# Patient Record
Sex: MALE | Race: WHITE | NOT HISPANIC OR LATINO | Employment: STUDENT | URBAN - METROPOLITAN AREA
[De-identification: names, ages, dates, MRNs, and addresses within clinical notes are randomized per-mention and may not be internally consistent; named-entity substitution may affect disease eponyms.]

---

## 2019-04-08 ENCOUNTER — OFFICE VISIT (OUTPATIENT)
Dept: URGENT CARE | Facility: CLINIC | Age: 19
End: 2019-04-08
Payer: COMMERCIAL

## 2019-04-08 VITALS
DIASTOLIC BLOOD PRESSURE: 83 MMHG | RESPIRATION RATE: 16 BRPM | OXYGEN SATURATION: 99 % | HEIGHT: 72 IN | WEIGHT: 180 LBS | TEMPERATURE: 98.2 F | SYSTOLIC BLOOD PRESSURE: 149 MMHG | BODY MASS INDEX: 24.38 KG/M2 | HEART RATE: 76 BPM

## 2019-04-08 DIAGNOSIS — R73.9 HYPERGLYCEMIA: Primary | ICD-10-CM

## 2019-04-08 PROCEDURE — 99203 OFFICE O/P NEW LOW 30 MIN: CPT | Performed by: EMERGENCY MEDICINE

## 2022-04-06 ENCOUNTER — OFFICE VISIT (OUTPATIENT)
Dept: URGENT CARE | Facility: CLINIC | Age: 22
End: 2022-04-06
Payer: COMMERCIAL

## 2022-04-06 VITALS
BODY MASS INDEX: 29.8 KG/M2 | SYSTOLIC BLOOD PRESSURE: 161 MMHG | DIASTOLIC BLOOD PRESSURE: 101 MMHG | HEART RATE: 96 BPM | RESPIRATION RATE: 18 BRPM | WEIGHT: 220 LBS | HEIGHT: 72 IN | OXYGEN SATURATION: 97 %

## 2022-04-06 DIAGNOSIS — H60.392 OTHER INFECTIVE ACUTE OTITIS EXTERNA OF LEFT EAR: Primary | ICD-10-CM

## 2022-04-06 PROCEDURE — 99213 OFFICE O/P EST LOW 20 MIN: CPT

## 2022-04-06 RX ORDER — INSULIN ASPART 100 [IU]/ML
INJECTION, SOLUTION INTRAVENOUS; SUBCUTANEOUS
COMMUNITY
Start: 2022-03-11

## 2022-04-06 RX ORDER — OFLOXACIN 3 MG/ML
10 SOLUTION AURICULAR (OTIC) DAILY
Qty: 5 ML | Refills: 0 | Status: SHIPPED | OUTPATIENT
Start: 2022-04-06 | End: 2022-04-13

## 2022-04-06 RX ORDER — INSULIN GLARGINE 100 [IU]/ML
INJECTION, SOLUTION SUBCUTANEOUS
COMMUNITY
Start: 2022-03-14

## 2022-04-06 NOTE — PATIENT INSTRUCTIONS
Administer 10 drops of ofloxacin otic solution to the left ear 1 time a day for the next 7 days  Avoid leaving water in your ear, make sure the clean ears after shower or when you submerge your head after showering or swimming

## 2022-04-06 NOTE — PROGRESS NOTES
[facility]        NAME: Laurie Kraus is a 20 y o  male  : 2000    MRN: [id number]  DATE: 2022  TIME: 6:18 PM    Assessment and Plan   Other infective acute otitis externa of left ear [H60 392]  1  Other infective acute otitis externa of left ear  ofloxacin (FLOXIN) 0 3 % otic solution         Patient Instructions     Administer 10 drops of ofloxacin otic solution to the left ear 1 time a day for the next 7 days  Avoid leaving water in your ear, make sure the clean ears after shower or when you submerge your head after showering or swimming  Follow up with PCP in 3-5 days  Proceed to  ER if symptoms worsen  Chief Complaint     Chief Complaint   Patient presents with   Ruthann Canter     left ear pain with drainage; started yesterday         History of Present Illness     Laurie Kraus is a 20 y o  male who presents with complaint of left ear drainage for the past day  Patient stated he woke up yesterday with left ear drainage, and has been draining since then  He presents today with continued left ear drainage, but denies pain  He states that he does have some mild tinnitus and hearing loss in the left ear  He denies fevers, chills, night sweats  Review of Systems   Review of Systems   Constitutional: Negative for chills, fatigue and fever  HENT: Positive for ear discharge and tinnitus (left)  Negative for ear pain  Respiratory: Negative for cough and shortness of breath  Cardiovascular: Negative for chest pain and palpitations  Gastrointestinal: Negative for abdominal pain, constipation, diarrhea, nausea and vomiting  Neurological: Negative for headaches  All other systems reviewed and are negative          Current Medications       Current Outpatient Medications:     insulin aspart (NovoLOG FlexPen) 100 UNIT/ML injection pen, , Disp: , Rfl:     Lantus SoloStar 100 units/mL injection pen, , Disp: , Rfl:     ofloxacin (FLOXIN) 0 3 % otic solution, Administer 10 drops into the left ear daily for 7 days, Disp: 5 mL, Rfl: 0    Current Allergies     Allergies as of 04/06/2022    (No Known Allergies)            The following portions of the patient's history were reviewed and updated as appropriate: allergies, current medications, past family history, past medical history, past social history, past surgical history and problem list      Past Medical History:   Diagnosis Date    Diabetes mellitus ([facility] [address] )        History reviewed  No pertinent surgical history  Family History   Problem Relation Age of Onset    Cancer Father          Medications have been verified  Objective   BP (!) 161/101   Pulse 96   Resp 18   Ht 6' (1 829 m)   Wt 99 8 kg (220 lb)   SpO2 97%   BMI 29 84 kg/m²   No LMP for male patient  Physical Exam     Physical Exam  Vitals and nursing note reviewed  Constitutional:       General: He is awake  He is not in acute distress  Appearance: Normal appearance  He is well-developed and well-groomed  He is obese  He is not ill-appearing  HENT:      Head: Normocephalic and atraumatic  Right Ear: Hearing, ear canal and external ear normal  No decreased hearing noted  No tenderness  Tympanic membrane is injected  Tympanic membrane is not perforated or erythematous  Left Ear: Decreased hearing noted  No tenderness  Tympanic membrane is not injected, perforated or erythematous  Cardiovascular:      Rate and Rhythm: Normal rate and regular rhythm  Heart sounds: Normal heart sounds  Pulmonary:      Effort: Pulmonary effort is normal       Breath sounds: Normal breath sounds  No wheezing, rhonchi or rales  Neurological:      Mental Status: He is alert  Psychiatric:         Behavior: Behavior is cooperative

## 2024-06-07 ENCOUNTER — OFFICE VISIT (OUTPATIENT)
Dept: URGENT CARE | Facility: CLINIC | Age: 24
End: 2024-06-07
Payer: COMMERCIAL

## 2024-06-07 VITALS
HEART RATE: 73 BPM | WEIGHT: 248 LBS | TEMPERATURE: 98 F | SYSTOLIC BLOOD PRESSURE: 146 MMHG | DIASTOLIC BLOOD PRESSURE: 96 MMHG | HEIGHT: 72 IN | BODY MASS INDEX: 33.59 KG/M2 | RESPIRATION RATE: 18 BRPM | TEMPERATURE: 98 F | OXYGEN SATURATION: 98 % | HEIGHT: 72 IN | BODY MASS INDEX: 33.59 KG/M2 | DIASTOLIC BLOOD PRESSURE: 96 MMHG | WEIGHT: 248 LBS | RESPIRATION RATE: 18 BRPM | HEART RATE: 73 BPM | SYSTOLIC BLOOD PRESSURE: 146 MMHG | OXYGEN SATURATION: 98 %

## 2024-06-07 DIAGNOSIS — G43.909 MIGRAINE WITHOUT STATUS MIGRAINOSUS, NOT INTRACTABLE, UNSPECIFIED MIGRAINE TYPE: Primary | ICD-10-CM

## 2024-06-07 PROCEDURE — G0382 LEV 3 HOSP TYPE B ED VISIT: HCPCS

## 2024-06-07 PROCEDURE — S9083 URGENT CARE CENTER GLOBAL: HCPCS

## 2024-06-07 RX ORDER — INSULIN GLARGINE 100 [IU]/ML
INJECTION, SOLUTION SUBCUTANEOUS
COMMUNITY

## 2024-06-07 RX ORDER — INSULIN ASPART 100 [IU]/ML
INJECTION, SUSPENSION SUBCUTANEOUS
COMMUNITY

## 2024-06-07 RX ORDER — RIZATRIPTAN BENZOATE 10 MG/1
10 TABLET ORAL AS NEEDED
Qty: 18 TABLET | Refills: 0 | Status: SHIPPED | OUTPATIENT
Start: 2024-06-07

## 2024-06-07 RX ORDER — ONDANSETRON 4 MG/1
4 TABLET, FILM COATED ORAL EVERY 8 HOURS PRN
Qty: 20 TABLET | Refills: 0 | Status: SHIPPED | OUTPATIENT
Start: 2024-06-07

## 2024-06-07 NOTE — PROGRESS NOTES
[facility]        NAME: Willam Mena is a 24 y.o. male  : 2000    MRN: [id number]  DATE: 2024  TIME: 2:04 PM    Assessment and Plan   Migraine without status migrainosus, not intractable, unspecified migraine type [G43.909]  1. Migraine without status migrainosus, not intractable, unspecified migraine type  rizatriptan (Maxalt) 10 mg tablet    ondansetron (ZOFRAN) 4 mg tablet        Will treat with maxalt and zofran  Encouraged supportive care  Follow up with PCP    Patient Instructions     Take Maxalt as prescribed  Zofran as prescribed    Advil 600mg + Tylenol 500mg every 6 hours  Over the counter benadryl  Drink plenty of water and avoid dehydrating fluids  Make sure to eat regularly     Follow up with PCP in 3-5 days.  Proceed to  ER if symptoms worsen.    If tests are performed, our office will contact you with results only if changes need to made to the care plan discussed with you at the visit. You can review your full results on [facility].    Chief Complaint     Chief Complaint   Patient presents with    Headache     Has been having headaches over the past week; today was the worse- has nausea and vomited 1x          History of Present Illness       24-year-old male with significant past medical history of diabetes that is on insulin arrives reporting headaches for the past week.  Patient reports the headaches wax and wanes, but never really goes away.  Patient reports pain is left-sided and worse around the left temple area.  Patient reports he has been taking Tylenol with mild relief of pain.  Patient reports that today the headache was worse, and when he went to try to eat breakfast he felt nauseated and threw it back up.  Patient denies abdominal pain, chest pain, or shortness of breath.  Patient denies blurry vision or double vision.  Patient denies any weakness in any extremities.  Patient denies dizziness, or lightheadedness, or any syncopal event.  Patient  reports he checked his blood sugar yesterday and it was 120, and that is his typical baseline.  Patient denies any fluctuations in his blood sugar readings.    Headache      Review of Systems   Review of Systems   Constitutional:  Negative for chills, fatigue and fever.   HENT:  Negative for congestion, postnasal drip, sinus pressure, sinus pain and sore throat.    Eyes:  Negative for pain.   Respiratory:  Negative for cough and shortness of breath.    Cardiovascular:  Negative for chest pain.   Gastrointestinal:  Positive for nausea and vomiting. Negative for abdominal pain and diarrhea.   Genitourinary:  Negative for difficulty urinating.   Musculoskeletal:  Negative for back pain.   Skin:  Negative for color change.   Neurological:  Positive for headaches. Negative for dizziness.         Current Medications       Current Outpatient Medications:     insulin aspart protamine-insulin aspart (NovoLOG 70/30) 100 units/mL injection, Inject under the skin 2 (two) times a day before meals, Disp: , Rfl:     insulin glargine (LANTUS) 100 units/mL subcutaneous injection, Inject under the skin, Disp: , Rfl:     ondansetron (ZOFRAN) 4 mg tablet, Take 1 tablet (4 mg total) by mouth every 8 (eight) hours as needed for nausea or vomiting, Disp: 20 tablet, Rfl: 0    rizatriptan (Maxalt) 10 mg tablet, Take 1 tablet (10 mg total) by mouth as needed for migraine (do not exceed 30mg in 24 hours) Take at the onset of migraine; if symptoms continue or return, may take another dose at least 2 hours after first dose. Take no more than 2 doses in a day., Disp: 18 tablet, Rfl: 0    Current Allergies     Allergies as of 06/07/2024    (No Known Allergies)            The following portions of the patient's history were reviewed and updated as appropriate: allergies, current medications, past family history, past medical history, past social history, past surgical history and problem list.     Past Medical History:   Diagnosis Date    Diabetes  ([facility])        History reviewed. No pertinent surgical history.    History reviewed. No pertinent family history.      Medications have been verified.        Objective   /96   Pulse 73   Temp 98 °F (36.7 °C)   Resp 18   Ht 6' (1.829 m)   Wt 112 kg (248 lb)   SpO2 98%   BMI 33.63 kg/m²        Physical Exam     Physical Exam  Vitals and nursing note reviewed.   Constitutional:       Appearance: Normal appearance.   HENT:      Head: Normocephalic.      Right Ear: Tympanic membrane, ear canal and external ear normal.      Left Ear: Tympanic membrane, ear canal and external ear normal.      Nose: Nose normal. No congestion or rhinorrhea.      Mouth/Throat:      Mouth: Mucous membranes are moist.      Pharynx: No oropharyngeal exudate or posterior oropharyngeal erythema.   Eyes:      General: Lids are normal. Vision grossly intact.      Extraocular Movements: Extraocular movements intact.      Right eye: No nystagmus.      Left eye: No nystagmus.      Conjunctiva/sclera: Conjunctivae normal.      Pupils: Pupils are equal, round, and reactive to light.   Cardiovascular:      Rate and Rhythm: Normal rate and regular rhythm.      Pulses: Normal pulses.      Heart sounds: Normal heart sounds.   Pulmonary:      Effort: Pulmonary effort is normal.      Breath sounds: Normal breath sounds. No wheezing.   Abdominal:      Palpations: Abdomen is soft.      Tenderness: There is no abdominal tenderness.   Musculoskeletal:         General: Normal range of motion.      Cervical back: Normal range of motion and neck supple.   Lymphadenopathy:      Cervical: No cervical adenopathy.   Skin:     General: Skin is warm and dry.   Neurological:      General: No focal deficit present.      Mental Status: He is alert and oriented to person, place, and time.      GCS: GCS eye subscore is 4. GCS verbal subscore is 5. GCS motor subscore is 6.      Cranial Nerves: No cranial nerve deficit.      Sensory: No sensory deficit.      Motor: No  weakness.      Coordination: Romberg sign negative.      Gait: Gait normal.   Psychiatric:         Mood and Affect: Mood normal.         Behavior: Behavior normal.

## 2024-06-07 NOTE — PATIENT INSTRUCTIONS
Take Maxalt as prescribed  Zofran as prescribed    Advil 600mg + Tylenol 500mg every 6 hours  Over the counter benadryl  Drink plenty of water and avoid dehydrating fluids  Make sure to eat regularly     Follow up with PCP in 3-5 days.  Proceed to  ER if symptoms worsen.    If tests are performed, our office will contact you with results only if changes need to made to the care plan discussed with you at the visit. You can review your full results on [facility].  Migraine Headache   WHAT YOU NEED TO KNOW:   A migraine is a severe headache. The pain can be so severe that it interferes with your daily activities. A migraine can last a few hours up to several days. The exact cause of migraines is not known.  DISCHARGE INSTRUCTIONS:   Call your local emergency number (911 in the ) or have someone call if:   You feel like you are going to faint, you become confused, or you have a seizure.      Return to the emergency department if:   You have a headache that seems different or much worse than your usual migraine headache.    You have a severe headache with a fever or a stiff neck.    You have new problems with speech, vision, balance, or movement.    Call your doctor or neurologist if:   Your migraines interfere with your daily activities.    Your medicines or treatments stop working.    You have questions or concerns about your condition or care.    Medicines:  Some medicines may only be given while you are in the emergency department. You may also need medicines later to manage migraines or other health problems they can cause. Take medicine as soon as you feel a migraine begin, or as directed.  Migraine medicines  are used to help prevent or stop a migraine.    NSAIDs  help decrease swelling and pain or fever. This medicine is available with or without a doctor's order. NSAIDs can cause stomach bleeding or kidney problems in certain people. If you take blood thinner medicine, always ask your healthcare  provider if NSAIDs are safe for you. Always read the medicine label and follow directions.    Acetaminophen  decreases pain and fever. It is available without a doctor's order. Ask how much to take and how often to take it. Follow directions. Read the labels of all other medicines you are using to see if they also contain acetaminophen, or ask your doctor or pharmacist. Acetaminophen can cause liver damage if not taken correctly.    Prescription pain medicine  may be given. Ask your healthcare provider how to take this medicine safely. Some prescription pain medicines contain acetaminophen. Do not take other medicines that contain acetaminophen without talking to your healthcare provider. Too much acetaminophen may cause liver damage. Prescription pain medicine may cause constipation. Ask your healthcare provider how to prevent or treat constipation.     Antinausea medicine  may be given to calm your stomach and to help prevent vomiting. This medicine can also help relieve pain.    Steroids  may be given to prevent a migraine from coming back right away.    Take your medicine as directed.  Contact your healthcare provider if you think your medicine is not helping or if you have side effects. Tell your provider if you are allergic to any medicine. Keep a list of the medicines, vitamins, and herbs you take. Include the amounts, and when and why you take them. Bring the list or the pill bottles to follow-up visits. Carry your medicine list with you in case of an emergency.    Manage your symptoms:   Rest in a dark, quiet room.  This will help decrease your pain. Sleep may also help relieve the pain.    Apply ice to decrease pain.  Use an ice pack, or put crushed ice in a plastic bag. Cover the ice pack with a towel and place it on your head. Apply ice for 15 to 20 minutes every hour.    Apply heat to decrease pain and muscle spasms.  Use a small towel dampened with warm water or a heating pad, or sit in a warm bath.  Apply heat on the area for 20 to 30 minutes every 2 hours. You may alternate heat and ice.    Keep a migraine record.  Write down when your migraines start and stop. Include your symptoms and what you were doing when a migraine began. Record what you ate or drank for 24 hours before the migraine started. Keep track of what you did to treat your migraine and if it worked. Bring the migraine record with you to visits with your healthcare provider.    Common triggers for a migraine include the following:   Stress, eye strain, oversleeping, or not getting enough sleep    Hormone changes in women from birth control pills, pregnancy, menopause, or during a monthly period    Skipping meals, going too long without eating, or not drinking enough liquids    Certain foods or drinks such as chocolate, hard cheese, alcohol, or drinks that contain caffeine    Foods that contain gluten, nitrates, MSG, or artificial sweeteners    Sunlight, bright or flashing lights, loud noises, smoke, or strong smells    Heat, humidity, or changes in the weather    Prevent another migraine:   Prevent a medicine overuse headache.  Take pain medicines only as long as directed. A medicine may be limited to a certain amount each month. Your healthcare provider can help you create a plan so you get a safe amount each month.    Do not smoke.  Nicotine and other chemicals in cigarettes and cigars can trigger a migraine or make it worse. Ask your healthcare provider for information if you currently smoke and need help to quit. E-cigarettes or smokeless tobacco still contain nicotine. Talk to your healthcare provider before you use these products.    Do not drink alcohol.  Alcohol can trigger a migraine. It can also keep medicines used to treat your migraines from working.    Be physically active.  Physical activity, such as exercise, may help prevent migraines. Talk to your healthcare provider about the best activity plan for you. Try to get at least 30  minutes of physical activity on most days.         Manage stress.  Stress may trigger a migraine. Learn new ways to relax, such as deep breathing.    Create a sleep schedule.  Go to bed and get up at the same times each day. Do not watch television before bed.    Eat a variety of healthy foods.  Include healthy foods such as fruit, vegetables, whole-grain breads, low-fat dairy products, beans, lean meat, and fish. Do not have food or drinks that trigger your migraines.         Drink more liquids to prevent dehydration.  Your healthcare provider can tell you how much liquid to drink each day and which liquids are best for you.    Follow up with your doctor or neurologist as directed:  Bring your migraine record with you. Write down your questions so you remember to ask them during your visits.  © Copyright [employer] 2023 Information is for End User's use only and may not be sold, redistributed or otherwise used for commercial purposes.  The above information is an  only. It is not intended as medical advice for individual conditions or treatments. Talk to your doctor, nurse or pharmacist before following any medical regimen to see if it is safe and effective for you.

## 2024-06-07 NOTE — LETTER
June 7, 2024     Patient: Willam Mena   YOB: 2000   Date of Visit: 6/7/2024       To Whom it May Concern:    Willam Mena was seen in my clinic on 6/7/2024. He may return to work on 06/08/2024 .    If you have any questions or concerns, please don't hesitate to call.         Sincerely,          ELLYN Zimmerman        CC: No Recipients

## 2024-06-09 ENCOUNTER — HOSPITAL ENCOUNTER (EMERGENCY)
Facility: HOSPITAL | Age: 24
Discharge: HOME | End: 2024-06-09
Attending: EMERGENCY MEDICINE
Payer: COMMERCIAL

## 2024-06-09 ENCOUNTER — APPOINTMENT (EMERGENCY)
Dept: CT IMAGING | Facility: HOSPITAL | Age: 24
End: 2024-06-09
Payer: COMMERCIAL

## 2024-06-09 VITALS
RESPIRATION RATE: 18 BRPM | DIASTOLIC BLOOD PRESSURE: 95 MMHG | OXYGEN SATURATION: 100 % | HEIGHT: 72 IN | SYSTOLIC BLOOD PRESSURE: 159 MMHG | BODY MASS INDEX: 33.35 KG/M2 | WEIGHT: 246.25 LBS | HEART RATE: 75 BPM | TEMPERATURE: 97.9 F

## 2024-06-09 DIAGNOSIS — N17.9 PRERENAL ACUTE RENAL FAILURE (HCC): ICD-10-CM

## 2024-06-09 DIAGNOSIS — E86.0 DEHYDRATION: ICD-10-CM

## 2024-06-09 DIAGNOSIS — R51.9 HEADACHE: Primary | ICD-10-CM

## 2024-06-09 LAB
ALBUMIN SERPL BCP-MCNC: 4.2 G/DL (ref 3.5–5)
ALP SERPL-CCNC: 63 U/L (ref 34–104)
ALT SERPL W P-5'-P-CCNC: 18 U/L (ref 7–52)
ANION GAP SERPL CALCULATED.3IONS-SCNC: 7 MMOL/L (ref 4–13)
ANION GAP SERPL CALCULATED.3IONS-SCNC: 7 MMOL/L (ref 4–13)
AST SERPL W P-5'-P-CCNC: 16 U/L (ref 13–39)
BASOPHILS # BLD AUTO: 0.04 THOUSANDS/ÂΜL (ref 0–0.1)
BASOPHILS NFR BLD AUTO: 1 % (ref 0–1)
BILIRUB SERPL-MCNC: 0.87 MG/DL (ref 0.2–1)
BUN SERPL-MCNC: 20 MG/DL (ref 5–25)
BUN SERPL-MCNC: 21 MG/DL (ref 5–25)
CALCIUM SERPL-MCNC: 9.2 MG/DL (ref 8.4–10.2)
CALCIUM SERPL-MCNC: 9.4 MG/DL (ref 8.4–10.2)
CHLORIDE SERPL-SCNC: 105 MMOL/L (ref 96–108)
CHLORIDE SERPL-SCNC: 106 MMOL/L (ref 96–108)
CK SERPL-CCNC: 102 U/L (ref 39–308)
CO2 SERPL-SCNC: 26 MMOL/L (ref 21–32)
CO2 SERPL-SCNC: 27 MMOL/L (ref 21–32)
CREAT SERPL-MCNC: 1.79 MG/DL (ref 0.6–1.3)
CREAT SERPL-MCNC: 1.87 MG/DL (ref 0.6–1.3)
EOSINOPHIL # BLD AUTO: 0.04 THOUSAND/ÂΜL (ref 0–0.61)
EOSINOPHIL NFR BLD AUTO: 1 % (ref 0–6)
ERYTHROCYTE [DISTWIDTH] IN BLOOD BY AUTOMATED COUNT: 12.7 % (ref 11.6–15.1)
GFR SERPL CREATININE-BSD FRML MDRD: 48 ML/MIN/1.73SQ M
GFR SERPL CREATININE-BSD FRML MDRD: 51 ML/MIN/1.73SQ M
GLUCOSE SERPL-MCNC: 85 MG/DL (ref 65–140)
GLUCOSE SERPL-MCNC: 91 MG/DL (ref 65–140)
HCT VFR BLD AUTO: 44.6 % (ref 36.5–49.3)
HGB BLD-MCNC: 14.7 G/DL (ref 12–17)
IMM GRANULOCYTES # BLD AUTO: 0.01 THOUSAND/UL (ref 0–0.2)
IMM GRANULOCYTES NFR BLD AUTO: 0 % (ref 0–2)
LYMPHOCYTES # BLD AUTO: 1.67 THOUSANDS/ÂΜL (ref 0.6–4.47)
LYMPHOCYTES NFR BLD AUTO: 23 % (ref 14–44)
MCH RBC QN AUTO: 28.3 PG (ref 26.8–34.3)
MCHC RBC AUTO-ENTMCNC: 33 G/DL (ref 31.4–37.4)
MCV RBC AUTO: 86 FL (ref 82–98)
MONOCYTES # BLD AUTO: 0.85 THOUSAND/ÂΜL (ref 0.17–1.22)
MONOCYTES NFR BLD AUTO: 12 % (ref 4–12)
NEUTROPHILS # BLD AUTO: 4.53 THOUSANDS/ÂΜL (ref 1.85–7.62)
NEUTS SEG NFR BLD AUTO: 63 % (ref 43–75)
NRBC BLD AUTO-RTO: 0 /100 WBCS
PLATELET # BLD AUTO: 248 THOUSANDS/UL (ref 149–390)
PMV BLD AUTO: 10.1 FL (ref 8.9–12.7)
POTASSIUM SERPL-SCNC: 4.2 MMOL/L (ref 3.5–5.3)
POTASSIUM SERPL-SCNC: 4.3 MMOL/L (ref 3.5–5.3)
PROT SERPL-MCNC: 7.3 G/DL (ref 6.4–8.4)
RBC # BLD AUTO: 5.19 MILLION/UL (ref 3.88–5.62)
SODIUM SERPL-SCNC: 139 MMOL/L (ref 135–147)
SODIUM SERPL-SCNC: 139 MMOL/L (ref 135–147)
WBC # BLD AUTO: 7.14 THOUSAND/UL (ref 4.31–10.16)

## 2024-06-09 PROCEDURE — 36415 COLL VENOUS BLD VENIPUNCTURE: CPT | Performed by: EMERGENCY MEDICINE

## 2024-06-09 PROCEDURE — 80053 COMPREHEN METABOLIC PANEL: CPT | Performed by: EMERGENCY MEDICINE

## 2024-06-09 PROCEDURE — 96375 TX/PRO/DX INJ NEW DRUG ADDON: CPT

## 2024-06-09 PROCEDURE — 82550 ASSAY OF CK (CPK): CPT | Performed by: EMERGENCY MEDICINE

## 2024-06-09 PROCEDURE — 86666 EHRLICHIA ANTIBODY: CPT | Performed by: EMERGENCY MEDICINE

## 2024-06-09 PROCEDURE — 86617 LYME DISEASE ANTIBODY: CPT | Performed by: EMERGENCY MEDICINE

## 2024-06-09 PROCEDURE — 96365 THER/PROPH/DIAG IV INF INIT: CPT

## 2024-06-09 PROCEDURE — 99283 EMERGENCY DEPT VISIT LOW MDM: CPT

## 2024-06-09 PROCEDURE — 99285 EMERGENCY DEPT VISIT HI MDM: CPT | Performed by: EMERGENCY MEDICINE

## 2024-06-09 PROCEDURE — 80048 BASIC METABOLIC PNL TOTAL CA: CPT | Performed by: EMERGENCY MEDICINE

## 2024-06-09 PROCEDURE — 86753 PROTOZOA ANTIBODY NOS: CPT | Performed by: EMERGENCY MEDICINE

## 2024-06-09 PROCEDURE — 85025 COMPLETE CBC W/AUTO DIFF WBC: CPT | Performed by: EMERGENCY MEDICINE

## 2024-06-09 PROCEDURE — 86618 LYME DISEASE ANTIBODY: CPT | Performed by: EMERGENCY MEDICINE

## 2024-06-09 PROCEDURE — 70450 CT HEAD/BRAIN W/O DYE: CPT

## 2024-06-09 RX ORDER — RIZATRIPTAN BENZOATE 10 MG/1
10 TABLET ORAL AS NEEDED
COMMUNITY
Start: 2024-06-07 | End: 2024-06-09

## 2024-06-09 RX ORDER — METOCLOPRAMIDE HYDROCHLORIDE 5 MG/ML
10 INJECTION INTRAMUSCULAR; INTRAVENOUS ONCE
Status: COMPLETED | OUTPATIENT
Start: 2024-06-09 | End: 2024-06-09

## 2024-06-09 RX ORDER — CLONIDINE HYDROCHLORIDE 0.1 MG/1
0.2 TABLET ORAL ONCE
Status: DISCONTINUED | OUTPATIENT
Start: 2024-06-09 | End: 2024-06-09 | Stop reason: HOSPADM

## 2024-06-09 RX ORDER — KETOROLAC TROMETHAMINE 30 MG/ML
15 INJECTION, SOLUTION INTRAMUSCULAR; INTRAVENOUS ONCE
Status: COMPLETED | OUTPATIENT
Start: 2024-06-09 | End: 2024-06-09

## 2024-06-09 RX ORDER — NAPROXEN 500 MG/1
500 TABLET ORAL 2 TIMES DAILY PRN
Qty: 30 TABLET | Refills: 0 | Status: SHIPPED | OUTPATIENT
Start: 2024-06-09 | End: 2024-06-10

## 2024-06-09 RX ORDER — ONDANSETRON 4 MG/1
4 TABLET, FILM COATED ORAL EVERY 8 HOURS PRN
COMMUNITY
Start: 2024-06-07 | End: 2024-06-10

## 2024-06-09 RX ORDER — METOCLOPRAMIDE 10 MG/1
10 TABLET ORAL EVERY 6 HOURS PRN
Qty: 30 TABLET | Refills: 0 | Status: SHIPPED | OUTPATIENT
Start: 2024-06-09

## 2024-06-09 RX ADMIN — METOCLOPRAMIDE HYDROCHLORIDE 10 MG: 5 INJECTION INTRAMUSCULAR; INTRAVENOUS at 11:17

## 2024-06-09 RX ADMIN — KETOROLAC TROMETHAMINE 15 MG: 30 INJECTION, SOLUTION INTRAMUSCULAR at 11:16

## 2024-06-09 RX ADMIN — SODIUM CHLORIDE, SODIUM LACTATE, POTASSIUM CHLORIDE, AND CALCIUM CHLORIDE 2000 ML: .6; .31; .03; .02 INJECTION, SOLUTION INTRAVENOUS at 12:07

## 2024-06-09 NOTE — DISCHARGE INSTRUCTIONS
Discontinue the Maxalt.  Start the other medications that are prescribed.  Use them as needed.  Please follow-up with your primary care provider.  You need to have repeat blood testing with regards to your kidney function and they can arrange for this.  We also recommend that your blood pressure be repeated as you may need blood pressure medication.      Increase your hydration.  Of course, return with any worsening.    Thank you for choosing the emergency department at [facility]. We appreciated the opportunity and privilege to address your healthcare needs. We remain available to you should you require additional evaluation or assistance. We value your feedback and would appreciate the opportunity to address anything you identified as an opportunity to improve or where we excelled. If there are colleagues who deserve special recognition, please let us know! We hope you are feeling better soon!    Please also note that sometimes there are subtle abnormalities in your lab values that you may observe when you access your record online.  These are frequently not worrisome and if they are of concern we will have discussed them with you.  However, we always encourage that you discuss any concerns you may have or observe on your record with your primary care provider.   Please also note that while your visit documentation was reviewed prior to completion, voice transcription will occasionally recognize words or grammar differently than what was spoken.

## 2024-06-09 NOTE — ED PROVIDER NOTES
"History  Chief Complaint   Patient presents with    Headache     Patient reports constant headache for one week. Seen at  and prescribed medication with some relief but not much. Vomiting last night due to pain. Also states that he was bit by tick around [place] Day.      24-year-old male with chief complaint of a fairly constant headache for about 1 week.  Predominantly left-sided.  No known trauma.  No fevers or chills.  Feels somewhat achy.  Reports a \"tick bite\" to his left thigh.  Patient has a history of diabetes.  Patient was seen at urgent care and started on Maxalt for his pain.  No significant relief with that.  Denies other complaint at this time.      History provided by:  Patient and relative  Headache  Pain location:  L parietal and L temporal  Quality:  Dull  Radiates to:  Does not radiate  Timing:  Intermittent  Progression:  Waxing and waning  Associated symptoms: nausea and neck pain    Associated symptoms: no back pain, no blurred vision, no congestion, no fever, no focal weakness, no photophobia, no visual change and no weakness        Prior to Admission Medications   Prescriptions Last Dose Informant Patient Reported? Taking?   Lantus SoloStar 100 units/mL injection pen   Yes Yes   insulin aspart (NovoLOG FlexPen) 100 UNIT/ML injection pen   Yes Yes   ondansetron (ZOFRAN) 4 mg tablet 6/9/2024 at 0830  Yes Yes   Sig: Take 4 mg by mouth every 8 (eight) hours as needed for nausea or vomiting   rizatriptan (MAXALT) 10 mg tablet 6/9/2024 at 0830  Yes Yes   Sig: Take 10 mg by mouth as needed for migraine      Facility-Administered Medications: None       Past Medical History:   Diagnosis Date    Diabetes mellitus (HCC)        History reviewed. No pertinent surgical history.    Family History   Problem Relation Age of Onset    Cancer Father      I have reviewed and agree with the history as documented.    E-Cigarette/Vaping    E-Cigarette Use Never User      E-Cigarette/Vaping Substances     Social " History     Tobacco Use    Smoking status: Never    Smokeless tobacco: Never   Vaping Use    Vaping status: Never Used   Substance Use Topics    Alcohol use: Not Currently    Drug use: Never       Review of Systems   Constitutional:  Negative for fever.   HENT:  Negative for congestion.    Eyes:  Negative for blurred vision and photophobia.   Respiratory:  Negative for shortness of breath and wheezing.    Gastrointestinal:  Positive for nausea.   Musculoskeletal:  Positive for neck pain. Negative for back pain.   Neurological:  Positive for headaches. Negative for focal weakness and weakness.       Physical Exam  Physical Exam  Vitals and nursing note reviewed.   Constitutional:       General: He is not in acute distress.     Appearance: He is normal weight. He is not ill-appearing or toxic-appearing.   HENT:      Head: Normocephalic and atraumatic.      Right Ear: External ear normal.      Left Ear: External ear normal.      Nose: Nose normal.      Mouth/Throat:      Mouth: Mucous membranes are moist.   Cardiovascular:      Rate and Rhythm: Normal rate.   Pulmonary:      Effort: Pulmonary effort is normal. No respiratory distress.      Breath sounds: No wheezing or rales.   Musculoskeletal:         General: No signs of injury.   Skin:     Coloration: Skin is not pale.   Neurological:      General: No focal deficit present.      Mental Status: He is alert and oriented to person, place, and time.      GCS: GCS eye subscore is 4. GCS verbal subscore is 5. GCS motor subscore is 6.      Cranial Nerves: No cranial nerve deficit or facial asymmetry.      Sensory: Sensation is intact.      Motor: No weakness.      Coordination: Coordination is intact.   Psychiatric:         Mood and Affect: Mood normal.         Thought Content: Thought content normal.         Judgment: Judgment normal.         Vital Signs  ED Triage Vitals [06/09/24 1049]   Temperature Pulse Respirations Blood Pressure SpO2   97.9 °F (36.6 °C) 76 16 (!)  181/119 98 %      Temp Source Heart Rate Source Patient Position - Orthostatic VS BP Location FiO2 (%)   Temporal Monitor Lying Left arm --      Pain Score       4           Vitals:    06/09/24 1049 06/09/24 1115 06/09/24 1326 06/09/24 1400   BP: (!) 181/119 (!) 168/111 151/93 159/95   Pulse: 76  80 75   Patient Position - Orthostatic VS: Lying  Sitting Sitting         Visual Acuity      ED Medications  Medications   cloNIDine (CATAPRES) tablet 0.2 mg (0 mg Oral Hold 6/9/24 1329)   ketorolac (TORADOL) injection 15 mg (15 mg Intravenous Given 6/9/24 1116)   metoclopramide (REGLAN) injection 10 mg (10 mg Intravenous Given 6/9/24 1117)   lactated ringers bolus 2,000 mL (0 mL Intravenous Stopped 6/9/24 1326)       Diagnostic Studies  Results Reviewed       Procedure Component Value Units Date/Time    Basic metabolic panel [[id number]]  (Abnormal) Collected: 06/09/24 1333    Lab Status: Final result Specimen: Blood from Arm, Left Updated: 06/09/24 1351     Sodium 139 mmol/L      Potassium 4.2 mmol/L      Chloride 106 mmol/L      CO2 26 mmol/L      ANION GAP 7 mmol/L      BUN 20 mg/dL      Creatinine 1.79 mg/dL      Glucose 85 mg/dL      Calcium 9.2 mg/dL      eGFR 51 ml/min/1.73sq m     Narrative:      National Kidney Disease Foundation guidelines for Chronic Kidney Disease (CKD):     Stage 1 with normal or high GFR (GFR > 90 mL/min/1.73 square meters)    Stage 2 Mild CKD (GFR = 60-89 mL/min/1.73 square meters)    Stage 3A Moderate CKD (GFR = 45-59 mL/min/1.73 square meters)    Stage 3B Moderate CKD (GFR = 30-44 mL/min/1.73 square meters)    Stage 4 Severe CKD (GFR = 15-29 mL/min/1.73 square meters)    Stage 5 End Stage CKD (GFR <15 mL/min/1.73 square meters)  Note: GFR calculation is accurate only with a steady state creatinine    CK [[id number]]  (Normal) Collected: 06/09/24 1116    Lab Status: Final result Specimen: Blood from Arm, Left Updated: 06/09/24 1237     Total  U/L     Comprehensive metabolic panel  [[id number]]  (Abnormal) Collected: 06/09/24 1116    Lab Status: Final result Specimen: Blood from Arm, Left Updated: 06/09/24 1144     Sodium 139 mmol/L      Potassium 4.3 mmol/L      Chloride 105 mmol/L      CO2 27 mmol/L      ANION GAP 7 mmol/L      BUN 21 mg/dL      Creatinine 1.87 mg/dL      Glucose 91 mg/dL      Calcium 9.4 mg/dL      AST 16 U/L      ALT 18 U/L      Alkaline Phosphatase 63 U/L      Total Protein 7.3 g/dL      Albumin 4.2 g/dL      Total Bilirubin 0.87 mg/dL      eGFR 48 ml/min/1.73sq m     Narrative:      National Kidney Disease Foundation guidelines for Chronic Kidney Disease (CKD):     Stage 1 with normal or high GFR (GFR > 90 mL/min/1.73 square meters)    Stage 2 Mild CKD (GFR = 60-89 mL/min/1.73 square meters)    Stage 3A Moderate CKD (GFR = 45-59 mL/min/1.73 square meters)    Stage 3B Moderate CKD (GFR = 30-44 mL/min/1.73 square meters)    Stage 4 Severe CKD (GFR = 15-29 mL/min/1.73 square meters)    Stage 5 End Stage CKD (GFR <15 mL/min/1.73 square meters)  Note: GFR calculation is accurate only with a steady state creatinine    CBC and differential [[id number]] Collected: 06/09/24 1116    Lab Status: Final result Specimen: Blood from Arm, Left Updated: 06/09/24 1124     WBC 7.14 Thousand/uL      RBC 5.19 Million/uL      Hemoglobin 14.7 g/dL      Hematocrit 44.6 %      MCV 86 fL      MCH 28.3 pg      MCHC 33.0 g/dL      RDW 12.7 %      MPV 10.1 fL      Platelets 248 Thousands/uL      nRBC 0 /100 WBCs      Segmented % 63 %      Immature Grans % 0 %      Lymphocytes % 23 %      Monocytes % 12 %      Eosinophils Relative 1 %      Basophils Relative 1 %      Absolute Neutrophils 4.53 Thousands/µL      Absolute Immature Grans 0.01 Thousand/uL      Absolute Lymphocytes 1.67 Thousands/µL      Absolute Monocytes 0.85 Thousand/µL      Eosinophils Absolute 0.04 Thousand/µL      Basophils Absolute 0.04 Thousands/µL     Lyme Total AB W Reflex to IGM/IGG [[id number]] Collected: 06/09/24 1116    Lab  Status: In process Specimen: Blood from Arm, Left Updated: 06/09/24 1122    Narrative:      The following orders were created for panel order Lyme Total AB W Reflex to IGM/IGG.  Procedure                               Abnormality         Status                     ---------                               -----------         ------                     Lyme Total AB W Reflex t...[[id number]]                      In process                   Please view results for these tests on the individual orders.    Lyme Total AB W Reflex to IGM/IGG [[id number]] Collected: 06/09/24 1116    Lab Status: In process Specimen: Blood from Arm, Left Updated: 06/09/24 1122    Ehrlichia antibody panel [[id number]] Collected: 06/09/24 1116    Lab Status: In process Specimen: Blood from Arm, Left Updated: 06/09/24 1122    Babesia microti antibody, IgG & Igm [[id number]] Collected: 06/09/24 1116    Lab Status: In process Specimen: Blood from Arm, Left Updated: 06/09/24 1122                   CT head without contrast   Final Result by Kb Mcbride MD (06/09 1353)      No acute intracranial hemorrhage, mass effect or edema.                  Workstation performed: [id number]                    Procedures  Procedures         ED Course  ED Course as of 06/09/24 1412   Sun Jun 09, 2024   1209 Creatinine 1.87.  Will provide rehydration therapy.  Patient reports he is feeling slightly better.   1326 Patient with persistent elevated blood pressure.  Have elected to obtain imaging.   1344 Blood Pressure: 151/93   1355 Patient with mild improvement in renal function after hydration.   1409 Imaging negative for acute process.  Patient's blood pressure has remained improved.  Now at 159/95.  Patient reports his headache is improved.  Will plan to discharge to home with instruction for patient to follow-up with his primary care provider for repeat evaluation of his kidney function.                                             Medical Decision  Making  Patient presented to the emergency department and a MSE was performed. The patient was evaluated for complaint related to acute headache.  Patient is potentially at risk for, but not limited to, tension headache, cluster migraine headache, subarachnoid hemorrhage, traumatic intracranial injury, other intracranial hemorrhage or intracranial infectious process.  Several of these diagnoses have been evaluated and ruled out by history and physical.  As needed, patient will be further evaluated with laboratory and imaging studies.  Higher level diagnostics, such as CT imaging or ultrasound, may also be required.  Please see work-up portion of the note for further evaluation of patient's risk.  Socioeconomic factors were also considered as part of the decision-making process.  Unless otherwise stated in the chart or patient is admitted as elsewhere documented, any previously prescribed medications will be maintained.      Problems Addressed:  Dehydration: acute illness or injury     Details: Consult patient about the need to follow-up with his PCP to have his renal function reevaluated.  Also will need to recheck his blood pressure.,  Obviously the two may be related.  Headache: acute illness or injury  Prerenal acute renal failure (HCC): acute illness or injury    Amount and/or Complexity of Data Reviewed  Labs: ordered.  Radiology: ordered.    Risk  Prescription drug management.             Disposition  Final diagnoses:   Headache   Dehydration   Prerenal acute renal failure (HCC)     Time reflects when diagnosis was documented in both MDM as applicable and the Disposition within this note       Time User Action Codes Description Comment    6/9/2024  2:10 PM Vick Estrada [R51.9] Headache     6/9/2024  2:10 PM Vick Estrada [E86.0] Dehydration     6/9/2024  2:10 PM Vick Estrada [N17.9] Prerenal acute renal failure (HCC)           ED Disposition       ED Disposition   Discharge    Condition    Stable    Date/Time   Sun Jun 9, 2024  2:10 PM    Comment   Endy Winklerherty discharge to home/self care.                   Follow-up Information       Follow up With Specialties Details Why Contact Info    Tessa Quezada MD Pediatrics In 1 week Even in well for repeat blood work regarding your kidney function. [address] [place] [id number]  [phone]              Patient's Medications   Discharge Prescriptions    METOCLOPRAMIDE (REGLAN) 10 MG TABLET    Take 1 tablet (10 mg total) by mouth every 6 (six) hours as needed (Nausea and vomiting)       Start Date: 6/9/2024  End Date: --       Order Dose: 10 mg       Quantity: 30 tablet    Refills: 0    NAPROXEN (NAPROSYN) 500 MG TABLET    Take 1 tablet (500 mg total) by mouth 2 (two) times a day as needed for mild pain or moderate pain       Start Date: 6/9/2024  End Date: --       Order Dose: 500 mg       Quantity: 30 tablet    Refills: 0       No discharge procedures on file.    PDMP Review       None            ED Provider  Electronically Signed by             Vick Estrada DO  06/09/24 1413       Vick Estrada,   06/09/24 1423

## 2024-06-09 NOTE — Clinical Note
Endy Collier was seen and treated in our emergency department on 6/9/2024.                Diagnosis:     Endy  may return to work on return date.    He may return on this date: 06/11/2024         If you have any questions or concerns, please don't hesitate to call.      Vick Estrada, DO    ______________________________           _______________          _______________  Hospital Representative                              Date                                Time

## 2024-06-10 ENCOUNTER — OFFICE VISIT (OUTPATIENT)
Dept: FAMILY MEDICINE CLINIC | Facility: CLINIC | Age: 24
End: 2024-06-10
Payer: COMMERCIAL

## 2024-06-10 VITALS
DIASTOLIC BLOOD PRESSURE: 102 MMHG | OXYGEN SATURATION: 98 % | BODY MASS INDEX: 33.08 KG/M2 | SYSTOLIC BLOOD PRESSURE: 152 MMHG | WEIGHT: 244.2 LBS | HEIGHT: 72 IN | HEART RATE: 72 BPM

## 2024-06-10 DIAGNOSIS — R51.9 NEW ONSET HEADACHE: Primary | ICD-10-CM

## 2024-06-10 DIAGNOSIS — R79.89 PRERENAL AZOTEMIA: ICD-10-CM

## 2024-06-10 DIAGNOSIS — R03.0 ELEVATED BLOOD PRESSURE READING: ICD-10-CM

## 2024-06-10 DIAGNOSIS — E10.9 TYPE 1 DIABETES MELLITUS WITHOUT COMPLICATION (HCC): ICD-10-CM

## 2024-06-10 LAB
B BURGDOR IGG SERPL QL IA: POSITIVE
B BURGDOR IGG+IGM SER QL IA: POSITIVE
B BURGDOR IGM SERPL QL IA: NEGATIVE

## 2024-06-10 PROCEDURE — 99204 OFFICE O/P NEW MOD 45 MIN: CPT | Performed by: INTERNAL MEDICINE

## 2024-06-10 RX ORDER — INDOMETHACIN 50 MG/1
50 CAPSULE ORAL
Qty: 30 CAPSULE | Refills: 1 | Status: SHIPPED | OUTPATIENT
Start: 2024-06-10 | End: 2024-06-18

## 2024-06-10 RX ORDER — INSULIN GLARGINE 100 [IU]/ML
20 INJECTION, SOLUTION SUBCUTANEOUS EVERY EVENING
Status: SHIPPED | COMMUNITY
Start: 2024-06-10

## 2024-06-10 NOTE — PROGRESS NOTES
Ambulatory Visit  Name: Endy Collier      : 2000      MRN: [id number]  Encounter Provider: Mark Sharif MD  Encounter Date: 6/10/2024   Encounter department: [facility]    Assessment & Plan  1.  New onset headache.  The patient's headaches could potentially be indicative of migraine syndrome or benign paroxysmal hemicrania. However, there is no discernible correlation between his diabetes and acute headaches. The current plan is to discontinue naproxen and initiate indomethacin at a dosage of 50 mg, to be taken thrice daily with meals for the next 4 to 5 days. A refill has been provided.  We discussed the possibility that this could be due to a tickborne disease.  He is already had a tickborne disease panel drawn and the results are pending.  We could consider empiric treatment with doxycycline if the indomethacin is ineffective.  Should there be no improvement in his symptoms, a consultation with a neurologist will be considered.    2.  Diabetes mellitus type 1 without complication    His last A1c was 5.7%.  He follows with a [facility] endocrinologist.    3.  Prerenal azotemia  His creatinine was 1.79 on repeat yesterday.  Suspect that given his BUN elevation that this is likely related to dehydration.  We will need to repeat his basic metabolic panel in the near future.    4.  Elevated blood pressure reading  He doesn't have a history of hypertension.  They have a blood pressure cuff at home and they are to be checking his blood pressure daily.  Will hold off on starting any blood pressure medications at this time since he likely has an elevation related to acute pain.         History of Present Illness     History of Present Illness  The patient is a 24-year-old male who is here today with his fiancée for evaluation of headaches.    The patient began experiencing headaches approximately 8 days ago, which progressively worsened until Friday when he experienced nausea and  headaches after work. Upon returning home, he experienced vomiting. He sought medical attention at an urgent care facility, where he was prescribed rizatriptan and ondansetron, which provided minimal relief. Despite the administration of naproxen, he continues to experience pressure and mild pain. His headaches, which he describes as sharp, have intensified over the past 8 days. He has a history of headaches, but these were not as severe. The headache is localized in the left temporal area, occasionally radiating towards the back, and is described as sharp. He denies any changes in vision, nausea, or vomiting associated with his headaches. Bright lights occasionally cause discomfort, but not on a regular basis. He denies any systemic symptoms such as fever, chills, or sweats. His fiancée reports that he consumed only one meal on Friday and Sunday, consumed half bottles of water, and wanted to sleep. During his emergency room visit, he was administered an acute Toradol injection, Reglan, and IV fluid, which provided temporary relief for 2 to 3 hours. However, the headaches returned approximately 30 minutes post-discharge.    The patient was bitten by a tick on his leg on Memorial Day weekend, which was itchy upon picking it off. He denies any presence of a bull's eye rash. Dr. Maldonado ordered tests for tick-borne infections, including Lyme disease, ehrlichia, and Babiesia, which are still pending.    The patient was diagnosed with type 1 diabetes in 2019, which was initially attributed to increased fluid intake, frequent urination, and a general feeling of malaise. His fingerstick glucose levels were found to be elevated, prompting a 1-week hospitalization at [place]. He was initially prescribed insulin injections, but not an insulin pump. His diabetes is managed by Dr. Parikh at [facility], with biannual appointments. His diabetes control has been well-managed, with no known complications such as eye, kidney, or  nerve damage in his feet. He regularly visits the eye doctor. His current medication regimen includes Lantus Solostar (20 units at dinner time), NovoLog (6 units with each meal), and naproxen (500 mg twice daily).           Objective     BP (!) 152/102 (BP Location: Right arm, Patient Position: Sitting, Cuff Size: Large)   Pulse 72   Ht 6' (1.829 m)   Wt 111 kg (244 lb 3.2 oz)   SpO2 98%   BMI 33.12 kg/m²     Physical Exam  A few benign-appearing moles are noted on the skin.  Physical Exam  Vitals reviewed.   Constitutional:       General: He is not in acute distress.     Appearance: Normal appearance. He is well-developed. He is not ill-appearing.   HENT:      Head: Normocephalic and atraumatic.      Comments: No reproducible tenderness on palpation of the left temporal region.     Right Ear: Tympanic membrane and external ear normal.      Left Ear: Tympanic membrane and external ear normal.      Nose: Nose normal.      Mouth/Throat:      Mouth: Mucous membranes are moist.      Pharynx: Oropharynx is clear.   Eyes:      General: No scleral icterus.  Neck:      Thyroid: No thyromegaly.      Vascular: No JVD.   Cardiovascular:      Rate and Rhythm: Normal rate and regular rhythm.      Heart sounds: Normal heart sounds. No murmur heard.     No friction rub. No gallop.   Pulmonary:      Breath sounds: Normal breath sounds.   Abdominal:      General: Bowel sounds are normal. There is no distension.      Palpations: Abdomen is soft. There is no mass.   Musculoskeletal:         General: No swelling.   Skin:     Comments: Scattered benign-appearing moles on his back.  He has a punctate repot bite in the left antecubital space without any associated surrounding erythema or bull's-eye lesion.   Neurological:      Mental Status: He is alert.      Comments: Cranial nerves II-XII intact, motor 5/5 in all major groups, cerebellar was intact to finger-to-nose.   Psychiatric:         Mood and Affect: Mood normal.        Administrative Statements

## 2024-06-11 ENCOUNTER — TELEPHONE (OUTPATIENT)
Age: 24
End: 2024-06-11

## 2024-06-11 NOTE — TELEPHONE ENCOUNTER
Patient's mother Veronica contacted the office this afternoon stating that her son saw results of blood work come back through [facility]. Was seen for an OV yesterday, wanting to know if medication should be continued at this time as was pending on results. Veronica relayed that she was calling because her son was at work.

## 2024-06-11 NOTE — TELEPHONE ENCOUNTER
Reviewing the Lyme antibody test, his IgM is negative (this is what we call the acute antibody) and his IgG is positive (will be called the chronic antibody.  This suggest that he may have been infected with Lyme disease in the past but probably does not have an acute infection.  Depending upon how he is doing, we did discuss starting doxycycline while we waited for the other tickborne infection test to come back.  Let me know what effect the indomethacin has had so far.

## 2024-06-12 ENCOUNTER — TELEPHONE (OUTPATIENT)
Age: 24
End: 2024-06-12

## 2024-06-12 ENCOUNTER — HOSPITAL ENCOUNTER (EMERGENCY)
Facility: HOSPITAL | Age: 24
Discharge: HOME | End: 2024-06-12
Attending: EMERGENCY MEDICINE
Payer: COMMERCIAL

## 2024-06-12 ENCOUNTER — NURSE TRIAGE (OUTPATIENT)
Age: 24
End: 2024-06-12

## 2024-06-12 ENCOUNTER — PATIENT MESSAGE (OUTPATIENT)
Dept: FAMILY MEDICINE CLINIC | Facility: CLINIC | Age: 24
End: 2024-06-12

## 2024-06-12 VITALS
HEART RATE: 67 BPM | DIASTOLIC BLOOD PRESSURE: 106 MMHG | OXYGEN SATURATION: 99 % | TEMPERATURE: 97.7 F | RESPIRATION RATE: 22 BRPM | SYSTOLIC BLOOD PRESSURE: 165 MMHG | BODY MASS INDEX: 32.96 KG/M2 | WEIGHT: 243 LBS

## 2024-06-12 DIAGNOSIS — I10 HYPERTENSION, UNSPECIFIED TYPE: ICD-10-CM

## 2024-06-12 DIAGNOSIS — R51.9 ACUTE NONINTRACTABLE HEADACHE, UNSPECIFIED HEADACHE TYPE: Primary | ICD-10-CM

## 2024-06-12 DIAGNOSIS — R11.2 NAUSEA AND VOMITING, UNSPECIFIED VOMITING TYPE: ICD-10-CM

## 2024-06-12 DIAGNOSIS — R51.9 NEW ONSET HEADACHE: Primary | ICD-10-CM

## 2024-06-12 LAB
A PHAGOCYTOPH IGG TITR SER IF: NEGATIVE {TITER}
A PHAGOCYTOPH IGM TITR SER IF: NEGATIVE {TITER}
ALBUMIN SERPL BCP-MCNC: 4.3 G/DL (ref 3.5–5)
ALP SERPL-CCNC: 58 U/L (ref 34–104)
ALT SERPL W P-5'-P-CCNC: 17 U/L (ref 7–52)
ANION GAP SERPL CALCULATED.3IONS-SCNC: 9 MMOL/L (ref 4–13)
AST SERPL W P-5'-P-CCNC: 20 U/L (ref 13–39)
ATRIAL RATE: 73 BPM
B MICROTI IGG TITR SER: NORMAL {TITER}
B MICROTI IGM TITR SER: NORMAL {TITER}
BASOPHILS # BLD AUTO: 0.03 THOUSANDS/ÂΜL (ref 0–0.1)
BASOPHILS NFR BLD AUTO: 0 % (ref 0–1)
BILIRUB SERPL-MCNC: 0.69 MG/DL (ref 0.2–1)
BUN SERPL-MCNC: 18 MG/DL (ref 5–25)
CALCIUM SERPL-MCNC: 9.5 MG/DL (ref 8.4–10.2)
CARDIAC TROPONIN I PNL SERPL HS: <2 NG/L
CHLORIDE SERPL-SCNC: 102 MMOL/L (ref 96–108)
CO2 SERPL-SCNC: 24 MMOL/L (ref 21–32)
CREAT SERPL-MCNC: 1.28 MG/DL (ref 0.6–1.3)
E CHAFFEENSIS IGG TITR SER IF: NEGATIVE {TITER}
E CHAFFEENSIS IGM TITR SER IF: NEGATIVE {TITER}
EOSINOPHIL # BLD AUTO: 0.03 THOUSAND/ÂΜL (ref 0–0.61)
EOSINOPHIL NFR BLD AUTO: 0 % (ref 0–6)
ERYTHROCYTE [DISTWIDTH] IN BLOOD BY AUTOMATED COUNT: 12.2 % (ref 11.6–15.1)
GFR SERPL CREATININE-BSD FRML MDRD: 77 ML/MIN/1.73SQ M
GLUCOSE SERPL-MCNC: 97 MG/DL (ref 65–140)
GLUCOSE SERPL-MCNC: 99 MG/DL (ref 65–140)
HCT VFR BLD AUTO: 41.9 % (ref 36.5–49.3)
HGB BLD-MCNC: 14.3 G/DL (ref 12–17)
IMM GRANULOCYTES # BLD AUTO: 0.04 THOUSAND/UL (ref 0–0.2)
IMM GRANULOCYTES NFR BLD AUTO: 0 % (ref 0–2)
LYMPHOCYTES # BLD AUTO: 1.08 THOUSANDS/ÂΜL (ref 0.6–4.47)
LYMPHOCYTES NFR BLD AUTO: 10 % (ref 14–44)
MCH RBC QN AUTO: 28.5 PG (ref 26.8–34.3)
MCHC RBC AUTO-ENTMCNC: 34.1 G/DL (ref 31.4–37.4)
MCV RBC AUTO: 84 FL (ref 82–98)
MONOCYTES # BLD AUTO: 0.55 THOUSAND/ÂΜL (ref 0.17–1.22)
MONOCYTES NFR BLD AUTO: 5 % (ref 4–12)
NEUTROPHILS # BLD AUTO: 8.85 THOUSANDS/ÂΜL (ref 1.85–7.62)
NEUTS SEG NFR BLD AUTO: 85 % (ref 43–75)
NRBC BLD AUTO-RTO: 0 /100 WBCS
P AXIS: 45 DEGREES
PLATELET # BLD AUTO: 252 THOUSANDS/UL (ref 149–390)
PMV BLD AUTO: 10.2 FL (ref 8.9–12.7)
POTASSIUM SERPL-SCNC: 4.3 MMOL/L (ref 3.5–5.3)
PR INTERVAL: 150 MS
PROT SERPL-MCNC: 7.4 G/DL (ref 6.4–8.4)
QRS AXIS: 31 DEGREES
QRSD INTERVAL: 86 MS
QT INTERVAL: 380 MS
QTC INTERVAL: 418 MS
RBC # BLD AUTO: 5.02 MILLION/UL (ref 3.88–5.62)
RESULT/COMMENT: NORMAL
RESULT/COMMENT: NORMAL
SODIUM SERPL-SCNC: 135 MMOL/L (ref 135–147)
T WAVE AXIS: 50 DEGREES
VENTRICULAR RATE: 73 BPM
WBC # BLD AUTO: 10.58 THOUSAND/UL (ref 4.31–10.16)

## 2024-06-12 PROCEDURE — 85025 COMPLETE CBC W/AUTO DIFF WBC: CPT | Performed by: EMERGENCY MEDICINE

## 2024-06-12 PROCEDURE — 99284 EMERGENCY DEPT VISIT MOD MDM: CPT

## 2024-06-12 PROCEDURE — 96375 TX/PRO/DX INJ NEW DRUG ADDON: CPT

## 2024-06-12 PROCEDURE — 96368 THER/DIAG CONCURRENT INF: CPT

## 2024-06-12 PROCEDURE — 96367 TX/PROPH/DG ADDL SEQ IV INF: CPT

## 2024-06-12 PROCEDURE — 96365 THER/PROPH/DIAG IV INF INIT: CPT

## 2024-06-12 PROCEDURE — 99284 EMERGENCY DEPT VISIT MOD MDM: CPT | Performed by: EMERGENCY MEDICINE

## 2024-06-12 PROCEDURE — 84484 ASSAY OF TROPONIN QUANT: CPT | Performed by: EMERGENCY MEDICINE

## 2024-06-12 PROCEDURE — 82948 REAGENT STRIP/BLOOD GLUCOSE: CPT

## 2024-06-12 PROCEDURE — 93005 ELECTROCARDIOGRAM TRACING: CPT

## 2024-06-12 PROCEDURE — 96361 HYDRATE IV INFUSION ADD-ON: CPT

## 2024-06-12 PROCEDURE — 36415 COLL VENOUS BLD VENIPUNCTURE: CPT | Performed by: EMERGENCY MEDICINE

## 2024-06-12 PROCEDURE — 80053 COMPREHEN METABOLIC PANEL: CPT | Performed by: EMERGENCY MEDICINE

## 2024-06-12 RX ORDER — MAGNESIUM SULFATE 1 G/100ML
1 INJECTION INTRAVENOUS ONCE
Status: COMPLETED | OUTPATIENT
Start: 2024-06-12 | End: 2024-06-12

## 2024-06-12 RX ORDER — AMLODIPINE BESYLATE 5 MG/1
5 TABLET ORAL ONCE
Status: COMPLETED | OUTPATIENT
Start: 2024-06-12 | End: 2024-06-12

## 2024-06-12 RX ORDER — DEXAMETHASONE SODIUM PHOSPHATE 10 MG/ML
8 INJECTION, SOLUTION INTRAMUSCULAR; INTRAVENOUS ONCE
Status: COMPLETED | OUTPATIENT
Start: 2024-06-12 | End: 2024-06-12

## 2024-06-12 RX ORDER — LISINOPRIL 10 MG/1
10 TABLET ORAL DAILY
Qty: 30 TABLET | Refills: 0 | Status: SHIPPED | OUTPATIENT
Start: 2024-06-12 | End: 2024-07-12

## 2024-06-12 RX ORDER — LISINOPRIL 10 MG/1
10 TABLET ORAL ONCE
Status: COMPLETED | OUTPATIENT
Start: 2024-06-12 | End: 2024-06-12

## 2024-06-12 RX ORDER — AMLODIPINE BESYLATE 5 MG/1
5 TABLET ORAL DAILY
Qty: 30 TABLET | Refills: 0 | Status: SHIPPED | OUTPATIENT
Start: 2024-06-12 | End: 2024-07-12

## 2024-06-12 RX ORDER — METOCLOPRAMIDE HYDROCHLORIDE 5 MG/ML
10 INJECTION INTRAMUSCULAR; INTRAVENOUS ONCE
Status: COMPLETED | OUTPATIENT
Start: 2024-06-12 | End: 2024-06-12

## 2024-06-12 RX ORDER — KETOROLAC TROMETHAMINE 30 MG/ML
15 INJECTION, SOLUTION INTRAMUSCULAR; INTRAVENOUS ONCE
Status: DISCONTINUED | OUTPATIENT
Start: 2024-06-12 | End: 2024-06-12

## 2024-06-12 RX ORDER — ONDANSETRON 2 MG/ML
4 INJECTION INTRAMUSCULAR; INTRAVENOUS ONCE
Status: COMPLETED | OUTPATIENT
Start: 2024-06-12 | End: 2024-06-12

## 2024-06-12 RX ORDER — ACETAMINOPHEN 10 MG/ML
1000 INJECTION, SOLUTION INTRAVENOUS ONCE
Status: COMPLETED | OUTPATIENT
Start: 2024-06-12 | End: 2024-06-12

## 2024-06-12 RX ORDER — DIPHENHYDRAMINE HYDROCHLORIDE 50 MG/ML
25 INJECTION INTRAMUSCULAR; INTRAVENOUS ONCE
Status: COMPLETED | OUTPATIENT
Start: 2024-06-12 | End: 2024-06-12

## 2024-06-12 RX ADMIN — AMLODIPINE BESYLATE 5 MG: 5 TABLET ORAL at 17:04

## 2024-06-12 RX ADMIN — VALPROATE SODIUM 500 MG: 100 INJECTION, SOLUTION INTRAVENOUS at 15:08

## 2024-06-12 RX ADMIN — ACETAMINOPHEN 1000 MG: 10 INJECTION INTRAVENOUS at 13:31

## 2024-06-12 RX ADMIN — SODIUM CHLORIDE 1000 ML: 0.9 INJECTION, SOLUTION INTRAVENOUS at 14:56

## 2024-06-12 RX ADMIN — LISINOPRIL 10 MG: 10 TABLET ORAL at 17:04

## 2024-06-12 RX ADMIN — MAGNESIUM SULFATE HEPTAHYDRATE 1 G: 1 INJECTION, SOLUTION INTRAVENOUS at 15:01

## 2024-06-12 RX ADMIN — DEXAMETHASONE SODIUM PHOSPHATE 8 MG: 10 INJECTION, SOLUTION INTRAMUSCULAR; INTRAVENOUS at 14:58

## 2024-06-12 RX ADMIN — DIPHENHYDRAMINE HYDROCHLORIDE 25 MG: 50 INJECTION, SOLUTION INTRAMUSCULAR; INTRAVENOUS at 14:58

## 2024-06-12 RX ADMIN — ONDANSETRON 4 MG: 2 INJECTION INTRAMUSCULAR; INTRAVENOUS at 13:30

## 2024-06-12 RX ADMIN — SODIUM CHLORIDE 1000 ML: 0.9 INJECTION, SOLUTION INTRAVENOUS at 13:29

## 2024-06-12 RX ADMIN — KETOROLAC TROMETHAMINE 15 MG: 30 INJECTION, SOLUTION INTRAMUSCULAR at 13:34

## 2024-06-12 RX ADMIN — METOCLOPRAMIDE 10 MG: 5 INJECTION, SOLUTION INTRAMUSCULAR; INTRAVENOUS at 14:58

## 2024-06-12 NOTE — DISCHARGE INSTRUCTIONS
Start new blood pressure medications as prescribed.  May take Reglan as needed for nausea.  Continue Indocin as previously prescribed.  Follow-up closely with primary care for recheck of symptoms and further management recommendations.

## 2024-06-12 NOTE — ED PROVIDER NOTES
History  Chief Complaint   Patient presents with    Migraine     For past few weeks seen at multiple facilites and nothing helping. Reports he had hypertension.      Patient presents to the emergency department for evaluation of headache that has been occurring over the past 10 days or so.  Has been seen here in the ED, responded well to migraine cocktail, but headache recurred.  Has also been taking naproxen with limited relief.  Was changed to Indocin by his primary care physician.  He is not getting much relief from that.  Denies any fevers.  Some nausea and vomiting this morning.  Intermittent photo and phono phobia.  No visual changes or focal numbness, tingling, or weakness.  Was bitten by a tick on May 30.  Had a tick panel done.  Lyme antibodies were positive for IgG, but negative for IgM, more consistent with prior infection with Lyme disease.  However, patient has no known prior history of Lyme disease.  Additional testing from tick panel still pending.  Patient was also noted to have slight kidney injury on his prior lab work.  Also noted to be hypertensive, this has been attributed to the pain from his headache rather than primary hypertension.  Patient denies any fevers, chills, chest pain, shortness of breath, or abdominal pain.  States the pain is mostly on the left side and radiates to the back.  Has had a CT scan done on prior visits.  No other complaints, modifying factors, or associated symptoms.        Prior to Admission Medications   Prescriptions Last Dose Informant Patient Reported? Taking?   Lantus SoloStar 100 units/mL SOPN 6/11/2024  Yes Yes   Sig: Inject 0.2 mL (20 Units total) under the skin every evening   indomethacin (INDOCIN) 50 mg capsule 6/12/2024  No Yes   Sig: Take 1 capsule (50 mg total) by mouth 3 (three) times a day with meals   insulin aspart (NovoLOG FlexPen) 100 UNIT/ML injection pen 6/12/2024  Yes Yes   metoclopramide (Reglan) 10 mg tablet 6/12/2024  No Yes   Sig: Take 1  tablet (10 mg total) by mouth every 6 (six) hours as needed (Nausea and vomiting)   ondansetron (ZOFRAN) 4 mg tablet Unknown  No No   Sig: Take 1 tablet (4 mg total) by mouth every 8 (eight) hours as needed for nausea or vomiting      Facility-Administered Medications: None       Past Medical History:   Diagnosis Date    Type 1 diabetes (HCC) 2019    Presented with 50 weight loss, polyuria and polydipsia. Found to have elevated sugar at [facility]. Was admitted to [facility] for 1 week.       History reviewed. No pertinent surgical history.    Family History   Problem Relation Age of Onset    Migraines Mother     Hypothyroidism Mother     Cancer Father         Brain tumor    No Known Problems Brother     Migraines Maternal Grandmother      I have reviewed and agree with the history as documented.    E-Cigarette/Vaping    E-Cigarette Use Never User      E-Cigarette/Vaping Substances     Social History     Tobacco Use    Smoking status: Never     Passive exposure: Never    Smokeless tobacco: Never   Vaping Use    Vaping status: Never Used   Substance Use Topics    Alcohol use: Not Currently    Drug use: Never       Review of Systems   All other systems reviewed and are negative.      Physical Exam  Physical Exam  Vitals and nursing note reviewed.   Constitutional:       General: He is not in acute distress.     Appearance: He is well-developed.   HENT:      Head: Normocephalic and atraumatic.      Mouth/Throat:      Mouth: Mucous membranes are moist.   Eyes:      Extraocular Movements: Extraocular movements intact.      Conjunctiva/sclera: Conjunctivae normal.      Pupils: Pupils are equal, round, and reactive to light.   Cardiovascular:      Rate and Rhythm: Normal rate and regular rhythm.      Pulses: Normal pulses.      Heart sounds: No murmur heard.     No friction rub. No gallop.   Pulmonary:      Effort: Pulmonary effort is normal. No respiratory distress.      Breath sounds: Normal breath sounds. No wheezing,  rhonchi or rales.   Abdominal:      General: There is no distension.      Palpations: Abdomen is soft.      Tenderness: There is no abdominal tenderness. There is no guarding or rebound.   Musculoskeletal:         General: No swelling. Normal range of motion.      Cervical back: Neck supple.   Skin:     General: Skin is warm and dry.      Capillary Refill: Capillary refill takes less than 2 seconds.      Findings: No rash.   Neurological:      General: No focal deficit present.      Mental Status: He is alert and oriented to person, place, and time.   Psychiatric:         Mood and Affect: Mood normal.         Behavior: Behavior normal.         Vital Signs  ED Triage Vitals [06/12/24 1249]   Temperature Pulse Respirations Blood Pressure SpO2   97.7 °F (36.5 °C) 79 18 (!) 198/128 98 %      Temp Source Heart Rate Source Patient Position - Orthostatic VS BP Location FiO2 (%)   Temporal Monitor Sitting Left arm --      Pain Score       7           Vitals:    06/12/24 1400 06/12/24 1430 06/12/24 1500 06/12/24 1530   BP: 154/97 159/100 (!) 166/109 150/90   Pulse: 80 88 77 75   Patient Position - Orthostatic VS: Sitting Lying Lying Lying         Visual Acuity      ED Medications  Medications   amLODIPine (NORVASC) tablet 5 mg (has no administration in time range)   lisinopril (ZESTRIL) tablet 10 mg (has no administration in time range)   sodium chloride 0.9 % bolus 1,000 mL (0 mL Intravenous Stopped 6/12/24 1444)   ondansetron (ZOFRAN) injection 4 mg (4 mg Intravenous Given 6/12/24 1330)   acetaminophen (Ofirmev) injection 1,000 mg (0 mg Intravenous Stopped 6/12/24 1407)   metoclopramide (REGLAN) injection 10 mg (10 mg Intravenous Given 6/12/24 1458)   diphenhydrAMINE (BENADRYL) injection 25 mg (25 mg Intravenous Given 6/12/24 1458)   dexamethasone (PF) (DECADRON) injection 8 mg (8 mg Intravenous Given 6/12/24 1458)   magnesium sulfate IVPB (premix) SOLN 1 g (0 g Intravenous Stopped 6/12/24 1554)   valproate (DEPACON) 500  mg in sodium chloride 0.9 % 50 mL BOLUS (0 mg Intravenous Stopped 6/12/24 1548)   sodium chloride 0.9 % bolus 1,000 mL (0 mL Intravenous Stopped 6/12/24 1555)       Diagnostic Studies  Results Reviewed       Procedure Component Value Units Date/Time    HS Troponin 0hr (reflex protocol) [[id number]]  (Normal) Collected: 06/12/24 1328    Lab Status: Final result Specimen: Blood from Arm, Right Updated: 06/12/24 1413     hs TnI 0hr <2 ng/L     Comprehensive metabolic panel [[id number]] Collected: 06/12/24 1328    Lab Status: Final result Specimen: Blood from Arm, Right Updated: 06/12/24 1405     Sodium 135 mmol/L      Potassium 4.3 mmol/L      Chloride 102 mmol/L      CO2 24 mmol/L      ANION GAP 9 mmol/L      BUN 18 mg/dL      Creatinine 1.28 mg/dL      Glucose 99 mg/dL      Calcium 9.5 mg/dL      AST 20 U/L      ALT 17 U/L      Alkaline Phosphatase 58 U/L      Total Protein 7.4 g/dL      Albumin 4.3 g/dL      Total Bilirubin 0.69 mg/dL      eGFR 77 ml/min/1.73sq m     Narrative:      National Kidney Disease Foundation guidelines for Chronic Kidney Disease (CKD):     Stage 1 with normal or high GFR (GFR > 90 mL/min/1.73 square meters)    Stage 2 Mild CKD (GFR = 60-89 mL/min/1.73 square meters)    Stage 3A Moderate CKD (GFR = 45-59 mL/min/1.73 square meters)    Stage 3B Moderate CKD (GFR = 30-44 mL/min/1.73 square meters)    Stage 4 Severe CKD (GFR = 15-29 mL/min/1.73 square meters)    Stage 5 End Stage CKD (GFR <15 mL/min/1.73 square meters)  Note: GFR calculation is accurate only with a steady state creatinine    CBC and differential [[id number]]  (Abnormal) Collected: 06/12/24 1328    Lab Status: Final result Specimen: Blood from Arm, Right Updated: 06/12/24 1347     WBC 10.58 Thousand/uL      RBC 5.02 Million/uL      Hemoglobin 14.3 g/dL      Hematocrit 41.9 %      MCV 84 fL      MCH 28.5 pg      MCHC 34.1 g/dL      RDW 12.2 %      MPV 10.2 fL      Platelets 252 Thousands/uL      nRBC 0 /100 WBCs      Segmented % 85 %       Immature Grans % 0 %      Lymphocytes % 10 %      Monocytes % 5 %      Eosinophils Relative 0 %      Basophils Relative 0 %      Absolute Neutrophils 8.85 Thousands/µL      Absolute Immature Grans 0.04 Thousand/uL      Absolute Lymphocytes 1.08 Thousands/µL      Absolute Monocytes 0.55 Thousand/µL      Eosinophils Absolute 0.03 Thousand/µL      Basophils Absolute 0.03 Thousands/µL     Fingerstick Glucose (POCT) [[id number]]  (Normal) Collected: 06/12/24 1304    Lab Status: Final result Specimen: Blood Updated: 06/12/24 1305     POC Glucose 97 mg/dl                    No orders to display              Procedures  Procedures         ED Course  ED Course as of 06/12/24 1703   Wed Jun 12, 2024   1446 Labs reviewed and discussed with patient.  Still mildly hypertensive, also complaining of headache.  Will order additional fluids as well as medications for headache.   1647 Patient reports headache still present, but has improved to 5 out of 10.  Blood pressure still elevated.  Suspect headache may be related to blood pressure.  Will initiate oral antihypertensives.  Patient does feel comfortable going home.  Will discharge with prescription for new blood pressure medicines, as well as Reglan for nausea.  Does have Zofran at home.  Reglan will be prescribed as an alternative.                               SBIRT 20yo+      Flowsheet Row Most Recent Value   Initial Alcohol Screen: US AUDIT-C     1. How often do you have a drink containing alcohol? 0 Filed at: 06/12/2024 1251   2. How many drinks containing alcohol do you have on a typical day you are drinking?  0 Filed at: 06/12/2024 1251   3a. Male UNDER 65: How often do you have five or more drinks on one occasion? 0 Filed at: 06/12/2024 1251   Audit-C Score 0 Filed at: 06/12/2024 1251   LIAM: How many times in the past year have you...    Used an illegal drug or used a prescription medication for non-medical reasons? Never Filed at: 06/12/2024 1251                       Medical Decision Making  Patient was seen and evaluated.  Labs obtained as noted.  Renal function has improved.  No signs of endorgan damage from patient's elevated blood pressure.  Tick panel results reviewed, positive for IgG but negative for IgM antibodies, most consistent with previous infection, rather than current.  Ehrlichiosis and babesiosis results came back during patient's ED course, both negative.  Patient has slight leukocytosis of unclear clinical significance.  Otherwise, patient has no clear indications for treatment with antibiotics.  Patient was given multiple medications for his headache as shown.  Reports improvement to 5 out of 10.  Blood pressure persistently elevated.  Patient's blood pressure initially thought to be related to pain from his headache.  However, given limited improvement of headache with medications given in the ED, currently speculate that patient's headache may indeed be related to elevated blood pressure.  Will initiate oral antihypertensive treatment with lisinopril and amlodipine, first dose is to be given in the ED.  Patient does feel comfortable going home.  There is no clear indication for hospital admission.  Advised close primary care follow-up.  Stable for discharge from the emergency department.  Return precautions reviewed.  All questions answered.    Amount and/or Complexity of Data Reviewed  Labs: ordered.  ECG/medicine tests: ordered and independent interpretation performed.     Details: EKG by my interpretation demonstrates normal sinus rhythm at a ventricular rate of 73 bpm.  Normal axis, normal intervals.  No acute ST elevations or T wave inversions.  No prior EKGs available for comparison.    Risk  Prescription drug management.             Disposition  Final diagnoses:   Acute nonintractable headache, unspecified headache type   Nausea and vomiting, unspecified vomiting type   Hypertension, unspecified type     Time reflects when diagnosis  was documented in both MDM as applicable and the Disposition within this note       Time User Action Codes Description Comment    6/12/2024  4:57 PM Norman Gill [R51.9] Acute nonintractable headache, unspecified headache type     6/12/2024  4:57 PM Norman Gill [R11.2] Nausea and vomiting, unspecified vomiting type     6/12/2024  4:58 PM Norman Gill [I10] Hypertension, unspecified type           ED Disposition       ED Disposition   Discharge    Condition   Stable    Date/Time   Wed Jun 12, 2024 1657    Comment   Endy Collier discharge to home/self care.                   Follow-up Information       Follow up With Specialties Details Why Contact Info Additional Information    Tessa Quezada MD Pediatrics   [address]  [place] [address]  [phone]       [facility] Emergency Medicine Go to  As needed, If symptoms worsen [address] [facility] [id number] [phone] [facility], [address], [place], [address]            Patient's Medications   Discharge Prescriptions    AMLODIPINE (NORVASC) 5 MG TABLET    Take 1 tablet (5 mg total) by mouth daily       Start Date: 6/12/2024 End Date: 7/12/2024       Order Dose: 5 mg       Quantity: 30 tablet    Refills: 0    LISINOPRIL (ZESTRIL) 10 MG TABLET    Take 1 tablet (10 mg total) by mouth daily       Start Date: 6/12/2024 End Date: 7/12/2024       Order Dose: 10 mg       Quantity: 30 tablet    Refills: 0       No discharge procedures on file.    PDMP Review       None            ED Provider  Electronically Signed by             Norman Gill MD  06/12/24 3583

## 2024-06-12 NOTE — TELEPHONE ENCOUNTER
"Patients mother calling in, states patient was seen on Monday for migraine. Patient c/o now of worsening severe migraine and a BP of 184/111. Advised patient needs to go to ER, Mother verbalized understanding states they will go to the [facility] ER  Reason for Disposition   Systolic BP >= 160 OR Diastolic >= 100, and any cardiac or neurologic symptoms (e.g., chest pain, difficulty breathing, unsteady gait, blurred vision)    Answer Assessment - Initial Assessment Questions  1. BLOOD PRESSURE: \"What is the blood pressure?\" \"Did you take at least two measurements 5 minutes apart?\"      184/111  2. ONSET: \"When did you take your blood pressure?\"      now  3. HOW: \"How did you obtain the blood pressure?\" (e.g., visiting nurse, automatic home BP monitor)      Home machine  4. HISTORY: \"Do you have a history of high blood pressure?\"      yes  5. MEDICATIONS: \"Are you taking any medications for blood pressure?\" \"Have you missed any doses recently?\"      none  6. OTHER SYMPTOMS: \"Do you have any symptoms?\" (e.g., headache, chest pain, blurred vision, difficulty breathing, weakness)      Severe HA    Protocols used: High Blood Pressure-ADULT-[place]    "

## 2024-06-18 ENCOUNTER — OFFICE VISIT (OUTPATIENT)
Dept: FAMILY MEDICINE CLINIC | Facility: CLINIC | Age: 24
End: 2024-06-18
Payer: COMMERCIAL

## 2024-06-18 VITALS
SYSTOLIC BLOOD PRESSURE: 136 MMHG | BODY MASS INDEX: 32.53 KG/M2 | WEIGHT: 240.2 LBS | HEART RATE: 85 BPM | HEIGHT: 72 IN | DIASTOLIC BLOOD PRESSURE: 80 MMHG | OXYGEN SATURATION: 96 %

## 2024-06-18 DIAGNOSIS — R51.9 NEW ONSET HEADACHE: Primary | ICD-10-CM

## 2024-06-18 DIAGNOSIS — E10.9 TYPE 1 DIABETES MELLITUS WITHOUT COMPLICATION (HCC): ICD-10-CM

## 2024-06-18 DIAGNOSIS — I10 ESSENTIAL HYPERTENSION: ICD-10-CM

## 2024-06-18 LAB
CREAT UR-MCNC: 141.6 MG/DL
MICROALBUMIN UR-MCNC: 14.2 MG/L
MICROALBUMIN/CREAT 24H UR: 10 MG/G CREATININE (ref 0–30)
SL AMB POCT HEMOGLOBIN AIC: 6.4 (ref ?–6.5)

## 2024-06-18 PROCEDURE — 82043 UR ALBUMIN QUANTITATIVE: CPT | Performed by: INTERNAL MEDICINE

## 2024-06-18 PROCEDURE — 82570 ASSAY OF URINE CREATININE: CPT | Performed by: INTERNAL MEDICINE

## 2024-06-18 PROCEDURE — 99214 OFFICE O/P EST MOD 30 MIN: CPT | Performed by: INTERNAL MEDICINE

## 2024-06-18 PROCEDURE — 83036 HEMOGLOBIN GLYCOSYLATED A1C: CPT | Performed by: INTERNAL MEDICINE

## 2024-06-18 RX ORDER — DEXAMETHASONE 4 MG/1
TABLET ORAL
Qty: 5 TABLET | Refills: 0 | Status: SHIPPED | OUTPATIENT
Start: 2024-06-18 | End: 2024-06-25

## 2024-06-18 NOTE — ASSESSMENT & PLAN NOTE
His blood pressure has improved on the combination of lisinopril and amlodipine.  Will check a basic metabolic panel in 2 weeks.

## 2024-06-18 NOTE — PROGRESS NOTES
Ambulatory Visit  Name: Endy Collier      : 2000      MRN: [id number]  Encounter Provider: Mark Sharif MD  Encounter Date: 2024   Encounter department: [facility]    Assessment & Plan  New onset headache  His headache has unfortunately been refractory.  No acute intracranial pathology was evident on his CT scan last week.  I'm going to discontinue the indomethacin as of today.  Going to give him dexamethasone 4 mg daily for 3 days then 2 mg daily for 3 days.  He was advised that this could raise his blood sugar.  He does have an insulin algorithm and has close contact with his endocrinologist.  Essential hypertension  His blood pressure has improved on the combination of lisinopril and amlodipine.  Will check a basic metabolic panel in 2 weeks.              History of Present Illness       History of Present Illness  The patient presents for evaluation of headache.    The patient sought emergency care due to persistent headache, accompanied by elevated blood pressure. Despite the initiation of lisinopril and amlodipine, the patient reports no improvement in his condition. He continues to experience left-sided headache, which has intensified since his last visit last week. He experienced a pounding sensation in his head an hour after returning to work yesterday, which persisted throughout the night until 10 a.m. this morning. The headache is intermittent, occurring almost daily since the onset 2 weeks ago. He has been self-administering indomethacin thrice daily, which provides temporary relief, but the headache recurs once the effect wears off. The headache is described as a pressure-like sensation, but when severe, it is accompanied by a throbbing sensation. He denies any photophobia, nausea, vomiting, blurred vision, double vision, unilateral weakness, or numbness. He monitors his blood pressure at home, which was 140/91 today. He attempted to schedule an appointment  with Neurology, but was unsuccessful.        Objective     /95 (BP Location: Left arm, Patient Position: Sitting, Cuff Size: Large)   Pulse 85   Ht 6' (1.829 m)   Wt 109 kg (240 lb 3.2 oz)   SpO2 96%   BMI 32.58 kg/m²     Physical Exam    Physical Exam  Constitutional:       General: He is not in acute distress.     Appearance: Normal appearance. He is not ill-appearing.   HENT:      Head:      Comments: No tenderness over the left temporal region.  Cardiovascular:      Pulses: no weak pulses.           Dorsalis pedis pulses are 2+ on the right side and 2+ on the left side.        Posterior tibial pulses are 2+ on the right side and 2+ on the left side.   Feet:      Right foot:      Skin integrity: No ulcer, skin breakdown, erythema, warmth, callus or dry skin.      Left foot:      Skin integrity: No ulcer, skin breakdown, erythema, warmth, callus or dry skin.   Neurological:      General: No focal deficit present.      Mental Status: He is alert.      Cranial Nerves: No cranial nerve deficit.      Motor: No weakness.      Coordination: Coordination normal.       Administrative Statements           Diabetic Foot Exam    Patient's shoes and socks removed.    Right Foot/Ankle   Right Foot Inspection  Skin Exam: skin normal and skin intact. No dry skin, no warmth, no callus, no erythema, no maceration, no abnormal color, no pre-ulcer, no ulcer and no callus.     Toe Exam: No tenderness    Vascular  The right DP pulse is 2+. The right PT pulse is 2+.     Left Foot/Ankle  Left Foot Inspection  Skin Exam: skin normal and skin intact. No dry skin, no warmth, no erythema, no maceration, normal color, no pre-ulcer, no ulcer and no callus.     Toe Exam: No tenderness.     Vascular  The left DP pulse is 2+. The left PT pulse is 2+.     Assign Risk Category  No deformity present  No loss of protective sensation  No weak pulses  Risk: 0

## 2024-06-18 NOTE — ASSESSMENT & PLAN NOTE
His headache has unfortunately been refractory.  No acute intracranial pathology was evident on his CT scan last week.  I'm going to discontinue the indomethacin as of today.  Going to give him dexamethasone 4 mg daily for 3 days then 2 mg daily for 3 days.  He was advised that this could raise his blood sugar.  He does have an insulin algorithm and has close contact with his endocrinologist.

## 2024-06-18 NOTE — LETTER
June 18, 2024     Patient: Endy Collier  YOB: 2000  Date of Visit: 6/18/2024      To Whom it May Concern:    Endy Collier is under my professional care. Endy was seen in my office on 6/18/2024. He missed work on account of acute illness 6/12-14 and 6/13. He is still having ongoing health issues. Endy may return to work on 6/25/24 .    If you have any questions or concerns, please don't hesitate to call.         Sincerely,          Mark Sharif MD

## 2024-06-25 ENCOUNTER — NURSE TRIAGE (OUTPATIENT)
Age: 24
End: 2024-06-25

## 2024-06-25 ENCOUNTER — OFFICE VISIT (OUTPATIENT)
Dept: FAMILY MEDICINE CLINIC | Facility: CLINIC | Age: 24
End: 2024-06-25
Payer: COMMERCIAL

## 2024-06-25 VITALS
OXYGEN SATURATION: 99 % | BODY MASS INDEX: 32.53 KG/M2 | DIASTOLIC BLOOD PRESSURE: 88 MMHG | SYSTOLIC BLOOD PRESSURE: 138 MMHG | HEART RATE: 92 BPM | HEIGHT: 72 IN | WEIGHT: 240.2 LBS

## 2024-06-25 DIAGNOSIS — R76.8 POSITIVE LYME DISEASE SEROLOGY: ICD-10-CM

## 2024-06-25 DIAGNOSIS — R51.9 SEVERE HEADACHE: Primary | ICD-10-CM

## 2024-06-25 PROCEDURE — 99214 OFFICE O/P EST MOD 30 MIN: CPT | Performed by: FAMILY MEDICINE

## 2024-06-25 RX ORDER — DOXYCYCLINE HYCLATE 100 MG
100 TABLET ORAL 2 TIMES DAILY
Qty: 28 TABLET | Refills: 0 | Status: SHIPPED | OUTPATIENT
Start: 2024-06-25 | End: 2024-07-09

## 2024-06-25 RX ORDER — TRAMADOL HYDROCHLORIDE 50 MG/1
50 TABLET ORAL EVERY 6 HOURS PRN
Qty: 45 TABLET | Refills: 0 | Status: SHIPPED | OUTPATIENT
Start: 2024-06-25

## 2024-06-25 RX ORDER — ONDANSETRON 4 MG/1
4 TABLET, ORALLY DISINTEGRATING ORAL EVERY 6 HOURS PRN
Qty: 30 TABLET | Refills: 1 | Status: SHIPPED | OUTPATIENT
Start: 2024-06-25

## 2024-06-25 NOTE — TELEPHONE ENCOUNTER
"Spouse calling on behalf of patient, states patient was started on a steroid leo for Migraine from OV on 6/18/24. Last night the pain was so bad patient started vomiting, Blood sugars have been increasing to 300+ (patient is on insuline). Currently sleeping, but patient is having severe pain despite OTC medication and Steroid, scheduled OV for today at 2 pm, Spouse denies signs of dehydration at the moment.       Reason for Disposition   SEVERE headache and vomiting    Answer Assessment - Initial Assessment Questions  1. LOCATION: \"Where does it hurt?\"       HA  2. ONSET: \"When did the headache start?\" (Minutes, hours or days)        1 month   3. PATTERN: \"Does the pain come and go, or has it been constant since it started?\"      constant  4. SEVERITY: \"How bad is the pain?\" and \"What does it keep you from doing?\"  (e.g., Scale 1-10; mild, moderate, or severe)      - SEVERE (8-10): excruciating pain, unable to do any normal activities           5. RECURRENT SYMPTOM: \"Have you ever had headaches before?\" If Yes, ask: \"When was the last time?\" and \"What happened that time?\"       unsure  6. CAUSE: \"What do you think is causing the headache?\"      unsure  7. MIGRAINE: \"Have you been diagnosed with migraine headaches?\" If Yes, ask: \"Is this headache similar?\"       yes  8. HEAD INJURY: \"Has there been any recent injury to the head?\"       denies  9. OTHER SYMPTOMS: \"Do you have any other symptoms?\" (fever, stiff neck, eye pain, sore throat, cold symptoms)      Nausea, vomiting,    Protocols used: Headache-ADULT-[place]    "

## 2024-06-25 NOTE — PROGRESS NOTES
Assessment/Plan:       1. Severe headache  Comments:  lyme vs migraine vs benigh paroxysmal rahul  ct reviewed and negative.  lyme IgG positive-- no hx of tx.  did have a tick on his arm prior to sx  Orders:  -     ondansetron (ZOFRAN-ODT) 4 mg disintegrating tablet; Take 1 tablet (4 mg total) by mouth every 6 (six) hours as needed for nausea or vomiting  -     traMADol (Ultram) 50 mg tablet; Take 1 tablet (50 mg total) by mouth every 6 (six) hours as needed for moderate pain  -     doxycycline hyclate (VIBRA-TABS) 100 mg tablet; Take 1 tablet (100 mg total) by mouth 2 (two) times a day for 14 days  2. Positive Lyme disease serology  Comments:  start doxy/zofran/tramadol  Orders:  -     ondansetron (ZOFRAN-ODT) 4 mg disintegrating tablet; Take 1 tablet (4 mg total) by mouth every 6 (six) hours as needed for nausea or vomiting  -     traMADol (Ultram) 50 mg tablet; Take 1 tablet (50 mg total) by mouth every 6 (six) hours as needed for moderate pain  -     doxycycline hyclate (VIBRA-TABS) 100 mg tablet; Take 1 tablet (100 mg total) by mouth 2 (two) times a day for 14 days        Subjective:      Patient ID: Endy Collier is a 24 y.o. male.    The patient has had a significant daily headache for a month.  He was to the ER.  CT scan negative for mass/bleed/infarct.  He was given indomethicin for possible BPH vs migraine then a steroid.  Sx persist.  He is getting associated photophobia and nausea.  Exertion and light bother him.  He did have a tick on his arm a few months ago.  His lyme titer showed IgG but negative IgM.  He has never been treated for lyme.      Migraine  Associated symptoms include vomiting.   Vomiting         The following portions of the patient's history were reviewed and updated as appropriate: allergies, current medications, past family history, past medical history, past social history, past surgical history, and problem list.    Review of Systems   Gastrointestinal:  Positive for vomiting.          Objective:      /88   Pulse 92   Ht 6' (1.829 m)   Wt 109 kg (240 lb 3.2 oz)   SpO2 99%   BMI 32.58 kg/m²          Physical Exam  Vitals and nursing note reviewed.   Constitutional:       Appearance: Normal appearance.   HENT:      Head: Normocephalic and atraumatic.      Nose: Nose normal.      Mouth/Throat:      Mouth: Mucous membranes are moist.   Eyes:      Extraocular Movements: Extraocular movements intact.      Pupils: Pupils are equal, round, and reactive to light.   Cardiovascular:      Rate and Rhythm: Normal rate and regular rhythm.      Pulses: Normal pulses.   Pulmonary:      Effort: Pulmonary effort is normal.      Breath sounds: Normal breath sounds.   Abdominal:      General: Bowel sounds are normal.      Palpations: Abdomen is soft.   Musculoskeletal:      Cervical back: Normal range of motion.   Skin:     General: Skin is warm and dry.      Capillary Refill: Capillary refill takes less than 2 seconds.   Neurological:      General: No focal deficit present.      Mental Status: He is alert.   Psychiatric:         Mood and Affect: Mood normal.

## 2024-06-25 NOTE — LETTER
RE: Endy Collier  : 23      To Whom It May Concern:           Mary was seen and evaluated in my office today 2024.  He has been out of work with severe intractable headache.  I am starting him on medication for Lyme disease among other things today.  He is unable to work in any capacity at this time.  He will follow-up with me next week to determine his ability to return to work.  I will fill out any forms in the meantime.      Sincerely,    Robert Budinetz MD

## 2024-06-27 ENCOUNTER — RA CDI HCC (OUTPATIENT)
Dept: OTHER | Facility: HOSPITAL | Age: 24
End: 2024-06-27

## 2024-07-02 ENCOUNTER — OFFICE VISIT (OUTPATIENT)
Dept: FAMILY MEDICINE CLINIC | Facility: CLINIC | Age: 24
End: 2024-07-02
Payer: COMMERCIAL

## 2024-07-02 VITALS
BODY MASS INDEX: 31.53 KG/M2 | DIASTOLIC BLOOD PRESSURE: 84 MMHG | WEIGHT: 232.8 LBS | HEIGHT: 72 IN | SYSTOLIC BLOOD PRESSURE: 130 MMHG | HEART RATE: 95 BPM | OXYGEN SATURATION: 96 %

## 2024-07-02 DIAGNOSIS — A69.20 LYME DISEASE: Primary | ICD-10-CM

## 2024-07-02 DIAGNOSIS — G89.29 CHRONIC NONINTRACTABLE HEADACHE, UNSPECIFIED HEADACHE TYPE: ICD-10-CM

## 2024-07-02 DIAGNOSIS — R51.9 CHRONIC NONINTRACTABLE HEADACHE, UNSPECIFIED HEADACHE TYPE: ICD-10-CM

## 2024-07-02 PROCEDURE — 99213 OFFICE O/P EST LOW 20 MIN: CPT | Performed by: FAMILY MEDICINE

## 2024-07-02 NOTE — PROGRESS NOTES
Assessment/Plan:       1. Lyme disease  Comments:  doing much better  complete doxy  may need a third week  2. Chronic nonintractable headache, unspecified headache type  Comments:  as above        Subjective:      Patient ID: Endy Collire is a 24 y.o. male.    Endy is doing much better on the doxycycline a week.  His headache is now more of a pressure and he rates it 3/10 in intensity.  He feels like he can return to work soon.        The following portions of the patient's history were reviewed and updated as appropriate: allergies, current medications, past family history, past medical history, past social history, past surgical history, and problem list.    Review of Systems   Respiratory: Negative.     Cardiovascular: Negative.          Objective:      /84 (BP Location: Left arm, Patient Position: Sitting, Cuff Size: Large)   Pulse 95   Ht 6' (1.829 m)   Wt 106 kg (232 lb 12.8 oz)   SpO2 96%   BMI 31.57 kg/m²          Physical Exam  Vitals and nursing note reviewed.   Constitutional:       Appearance: Normal appearance.   HENT:      Head: Normocephalic and atraumatic.      Nose: Nose normal.      Mouth/Throat:      Mouth: Mucous membranes are moist.   Eyes:      Extraocular Movements: Extraocular movements intact.      Pupils: Pupils are equal, round, and reactive to light.   Cardiovascular:      Rate and Rhythm: Normal rate and regular rhythm.      Pulses: Normal pulses.   Pulmonary:      Effort: Pulmonary effort is normal.      Breath sounds: Normal breath sounds.   Abdominal:      General: Bowel sounds are normal.      Palpations: Abdomen is soft.   Musculoskeletal:      Cervical back: Normal range of motion.   Skin:     General: Skin is warm and dry.      Capillary Refill: Capillary refill takes less than 2 seconds.   Neurological:      General: No focal deficit present.      Mental Status: He is alert.   Psychiatric:         Mood and Affect: Mood normal.

## 2024-07-02 NOTE — PROGRESS NOTES
Assessment/Plan:             Subjective:      Patient ID: Endy Collier is a 24 y.o. male.    HPI    The following portions of the patient's history were reviewed and updated as appropriate: allergies, current medications, past family history, past medical history, past social history, past surgical history, and problem list.    Review of Systems   Respiratory: Negative.     Cardiovascular: Negative.    Gastrointestinal: Negative.          Objective:      There were no vitals taken for this visit.         Physical Exam  Vitals and nursing note reviewed.   Constitutional:       Appearance: Normal appearance.   HENT:      Head: Normocephalic and atraumatic.      Nose: Nose normal.      Mouth/Throat:      Mouth: Mucous membranes are moist.   Eyes:      Extraocular Movements: Extraocular movements intact.      Pupils: Pupils are equal, round, and reactive to light.   Cardiovascular:      Rate and Rhythm: Normal rate and regular rhythm.      Pulses: Normal pulses.   Pulmonary:      Effort: Pulmonary effort is normal.      Breath sounds: Normal breath sounds.   Abdominal:      General: Bowel sounds are normal.      Palpations: Abdomen is soft.   Musculoskeletal:      Cervical back: Normal range of motion.   Skin:     General: Skin is warm and dry.      Capillary Refill: Capillary refill takes less than 2 seconds.   Neurological:      General: No focal deficit present.      Mental Status: He is alert.   Psychiatric:         Mood and Affect: Mood normal.

## 2024-07-08 ENCOUNTER — OFFICE VISIT (OUTPATIENT)
Dept: FAMILY MEDICINE CLINIC | Facility: CLINIC | Age: 24
End: 2024-07-08
Payer: COMMERCIAL

## 2024-07-08 VITALS
OXYGEN SATURATION: 96 % | HEART RATE: 94 BPM | SYSTOLIC BLOOD PRESSURE: 127 MMHG | DIASTOLIC BLOOD PRESSURE: 80 MMHG | BODY MASS INDEX: 31.69 KG/M2 | HEIGHT: 72 IN | WEIGHT: 234 LBS

## 2024-07-08 DIAGNOSIS — R76.8 POSITIVE LYME DISEASE SEROLOGY: ICD-10-CM

## 2024-07-08 DIAGNOSIS — I10 HYPERTENSION, UNSPECIFIED TYPE: ICD-10-CM

## 2024-07-08 DIAGNOSIS — R51.9 SEVERE HEADACHE: ICD-10-CM

## 2024-07-08 DIAGNOSIS — A69.20 LYME DISEASE: Primary | ICD-10-CM

## 2024-07-08 PROCEDURE — 99214 OFFICE O/P EST MOD 30 MIN: CPT | Performed by: NURSE PRACTITIONER

## 2024-07-08 RX ORDER — LISINOPRIL 10 MG/1
10 TABLET ORAL DAILY
Qty: 30 TABLET | Refills: 1 | Status: SHIPPED | OUTPATIENT
Start: 2024-07-08

## 2024-07-08 RX ORDER — AMLODIPINE BESYLATE 5 MG/1
5 TABLET ORAL DAILY
Qty: 30 TABLET | Refills: 1 | Status: SHIPPED | OUTPATIENT
Start: 2024-07-08

## 2024-07-08 RX ORDER — PANTOPRAZOLE SODIUM 20 MG/1
20 TABLET, DELAYED RELEASE ORAL
Qty: 30 TABLET | Refills: 5 | Status: SHIPPED | OUTPATIENT
Start: 2024-07-08 | End: 2025-01-04

## 2024-07-08 RX ORDER — NAPROXEN 500 MG/1
500 TABLET ORAL 2 TIMES DAILY WITH MEALS
Qty: 60 TABLET | Refills: 1 | Status: SHIPPED | OUTPATIENT
Start: 2024-07-08

## 2024-07-08 RX ORDER — DOXYCYCLINE HYCLATE 100 MG/1
100 CAPSULE ORAL EVERY 12 HOURS SCHEDULED
Qty: 14 CAPSULE | Refills: 0 | Status: SHIPPED | OUTPATIENT
Start: 2024-07-08 | End: 2024-07-15

## 2024-07-08 NOTE — LETTER
July 8, 2024     Patient: Endy Collier  YOB: 2000  Date of Visit: 7/8/2024      To Whom it May Concern:    Endy Collier is under my professional care. Endy was seen in my office on 7/8/2024. Due to his current, temporary, medical issue please allow Endy to work light duty, including no lifting over 20 pounds and working in an environment over 90 F.  The light duty would from 07/09/2024 to 07/23/2024.     If you have any questions or concerns, please don't hesitate to call.         Sincerely,          CARLIE Mcguire

## 2024-07-08 NOTE — LETTER
July 8, 2024     Patient: Endy Collier  YOB: 2000  Date of Visit: 7/8/2024      To Whom it May Concern:    Endy Collier is under my professional care. Endy was seen in my office on 7/8/2024. Please excuse him from work on 07/08/2024.     If you have any questions or concerns, please don't hesitate to call.         Sincerely,          CARLIE Mcguire

## 2024-07-09 NOTE — PROGRESS NOTES
Ambulatory Visit  Name: Endy Collier      : 2000      MRN: [id number]  Encounter Provider: CARLIE Mcguire  Encounter Date: 2024   Encounter department: [facility]    Assessment & Plan   1. Lyme disease  -     doxycycline hyclate (VIBRAMYCIN) 100 mg capsule; Take 1 capsule (100 mg total) by mouth every 12 (twelve) hours for 7 days  -     naproxen (Naprosyn) 500 mg tablet; Take 1 tablet (500 mg total) by mouth 2 (two) times a day with meals  -     pantoprazole (PROTONIX) 20 mg tablet; Take 1 tablet (20 mg total) by mouth daily before breakfast  2. Positive Lyme disease serology  -     doxycycline hyclate (VIBRAMYCIN) 100 mg capsule; Take 1 capsule (100 mg total) by mouth every 12 (twelve) hours for 7 days  -     naproxen (Naprosyn) 500 mg tablet; Take 1 tablet (500 mg total) by mouth 2 (two) times a day with meals  -     pantoprazole (PROTONIX) 20 mg tablet; Take 1 tablet (20 mg total) by mouth daily before breakfast  3. Severe headache  -     doxycycline hyclate (VIBRAMYCIN) 100 mg capsule; Take 1 capsule (100 mg total) by mouth every 12 (twelve) hours for 7 days  -     naproxen (Naprosyn) 500 mg tablet; Take 1 tablet (500 mg total) by mouth 2 (two) times a day with meals  -     pantoprazole (PROTONIX) 20 mg tablet; Take 1 tablet (20 mg total) by mouth daily before breakfast  4. Hypertension, unspecified type  -     amLODIPine (NORVASC) 5 mg tablet; Take 1 tablet (5 mg total) by mouth daily  -     lisinopril (ZESTRIL) 10 mg tablet; Take 1 tablet (10 mg total) by mouth daily  Negative CT scan r/o IIH for the present.     Will treat headache as inflammation due to lyme.  He has taken a course of dexamethasone approximately 3 weeks ago - will have him start Naproxen BID and will add an additional 2 weeks of Doxycycline to total a 28 day course.   Will have him start a PPI also due to daily NSAID use.       History of Present Illness     Here for a Headache that has been  going on for 6-8 weeks - diagnosed and started treatment for lyme.   He notes it was more of a pressure last visit but since then the pain has been increasing.  Reports the pain can be severe at times.  Had a CT scan the beginning of June near onset of the headaches which was normal - no increased pressure noted at the optic nerve/disc.  Previously the pain was more severe causing vomiting but notes since then it has never reached as bad as vomiting like previously. He is currently on his second week of Doxycycline.      Headache      Review of Systems   Eyes: Negative.    Respiratory: Negative.     Cardiovascular: Negative.    Neurological:  Positive for headaches.   All other systems reviewed and are negative.    Current Outpatient Medications on File Prior to Visit   Medication Sig Dispense Refill    doxycycline hyclate (VIBRA-TABS) 100 mg tablet Take 1 tablet (100 mg total) by mouth 2 (two) times a day for 14 days 28 tablet 0    insulin aspart (NovoLOG FlexPen) 100 UNIT/ML injection pen       Lantus SoloStar 100 units/mL SOPN Inject 0.2 mL (20 Units total) under the skin every evening      ondansetron (ZOFRAN-ODT) 4 mg disintegrating tablet Take 1 tablet (4 mg total) by mouth every 6 (six) hours as needed for nausea or vomiting 30 tablet 1    traMADol (Ultram) 50 mg tablet Take 1 tablet (50 mg total) by mouth every 6 (six) hours as needed for moderate pain 45 tablet 0     No current facility-administered medications on file prior to visit.      Objective     /80 (BP Location: Left arm, Patient Position: Sitting, Cuff Size: Large)   Pulse 94   Ht 6' (1.829 m)   Wt 106 kg (234 lb)   SpO2 96%   BMI 31.74 kg/m²     Physical Exam  Vitals reviewed.   Eyes:      Pupils: Pupils are equal, round, and reactive to light.   Neurological:      General: No focal deficit present.      Mental Status: He is alert and oriented to person, place, and time.       Administrative Statements

## 2024-07-24 ENCOUNTER — OFFICE VISIT (OUTPATIENT)
Dept: FAMILY MEDICINE CLINIC | Facility: CLINIC | Age: 24
End: 2024-07-24
Payer: COMMERCIAL

## 2024-07-24 VITALS
BODY MASS INDEX: 32.34 KG/M2 | DIASTOLIC BLOOD PRESSURE: 82 MMHG | OXYGEN SATURATION: 98 % | HEART RATE: 84 BPM | WEIGHT: 238.8 LBS | HEIGHT: 72 IN | SYSTOLIC BLOOD PRESSURE: 122 MMHG

## 2024-07-24 DIAGNOSIS — Z00.00 ANNUAL PHYSICAL EXAM: Primary | ICD-10-CM

## 2024-07-24 PROCEDURE — 99395 PREV VISIT EST AGE 18-39: CPT | Performed by: FAMILY MEDICINE

## 2024-07-24 NOTE — PROGRESS NOTES
Diabetic Foot Exam    Patient's shoes and socks removed.    Right Foot/Ankle   Right Foot Inspection  Skin Exam: skin normal and skin intact. No dry skin, no warmth, no callus, no erythema, no maceration, no abnormal color, no pre-ulcer, no ulcer and no callus.     Toe Exam: ROM and strength within normal limits.     Sensory   Monofilament testing: intact    Vascular  Capillary refills: < 3 seconds  The right DP pulse is 2+. The right PT pulse is 2+.     Left Foot/Ankle  Left Foot Inspection  Skin Exam: skin normal and skin intact. No dry skin, no warmth, no erythema, no maceration, normal color, no pre-ulcer, no ulcer and no callus.     Toe Exam: ROM and strength within normal limits.     Sensory   Monofilament testing: intact    Vascular  Capillary refills: < 3 seconds  The left DP pulse is 2+. The left PT pulse is 2+.     Assign Risk Category  No deformity present  No loss of protective sensation  No weak pulses  Risk: 0  Assessment/Plan:       1. Annual physical exam  Assessment & Plan:  Reviewed age-appropriate health maintenance and preventive care.          Subjective:      Patient ID: Endy Collier is a 24 y.o. male.    Endy is here for an annual checkup.  He is doing well.  Since we treated the Lyme disease headache is much much better.  It is still there but not much pain it is just noticeable.  He does have an appointment with a neurologist.  I again reviewed his blood work and CT scan which was okay.  Chronically he is very healthy he has type 1 diabetes is managed by endocrinology and his A1c is well-controlled.  He has no active chest pain or shortness of breath.  He does not use drugs smoke or drink.  He understands importance of healthy diet and exercise.        The following portions of the patient's history were reviewed and updated as appropriate: allergies, current medications, past family history, past medical history, past social history, past surgical history, and problem list.    Review  of Systems   Respiratory: Negative.     Cardiovascular: Negative.    Gastrointestinal: Negative.      Depression Screening and Follow-up Plan: Patient was screened for depression during today's encounter. They screened negative with a PHQ-2 score of 0.          Objective:      /82 (BP Location: Left arm, Patient Position: Sitting, Cuff Size: Large)   Pulse 84   Ht 6' (1.829 m)   Wt 108 kg (238 lb 12.8 oz)   SpO2 98%   BMI 32.39 kg/m²          Physical Exam  Vitals and nursing note reviewed.   Constitutional:       Appearance: Normal appearance.   HENT:      Head: Normocephalic and atraumatic.      Nose: Nose normal.      Mouth/Throat:      Mouth: Mucous membranes are moist.   Eyes:      Extraocular Movements: Extraocular movements intact.      Pupils: Pupils are equal, round, and reactive to light.   Cardiovascular:      Rate and Rhythm: Normal rate and regular rhythm.      Pulses: Normal pulses. no weak pulses.           Dorsalis pedis pulses are 2+ on the right side and 2+ on the left side.        Posterior tibial pulses are 2+ on the right side and 2+ on the left side.   Pulmonary:      Effort: Pulmonary effort is normal.      Breath sounds: Normal breath sounds.   Abdominal:      General: Bowel sounds are normal.      Palpations: Abdomen is soft.   Musculoskeletal:      Cervical back: Normal range of motion.   Feet:      Right foot:      Skin integrity: No ulcer, skin breakdown, erythema, warmth, callus or dry skin.      Left foot:      Skin integrity: No ulcer, skin breakdown, erythema, warmth, callus or dry skin.   Skin:     General: Skin is warm and dry.      Capillary Refill: Capillary refill takes less than 2 seconds.   Neurological:      General: No focal deficit present.      Mental Status: He is alert.   Psychiatric:         Mood and Affect: Mood normal.

## 2024-08-01 ENCOUNTER — TELEPHONE (OUTPATIENT)
Dept: NEUROLOGY | Facility: CLINIC | Age: 24
End: 2024-08-01

## 2024-08-06 ENCOUNTER — CONSULT (OUTPATIENT)
Dept: NEUROLOGY | Facility: CLINIC | Age: 24
End: 2024-08-06
Payer: COMMERCIAL

## 2024-08-06 VITALS
WEIGHT: 240 LBS | TEMPERATURE: 97.8 F | SYSTOLIC BLOOD PRESSURE: 122 MMHG | BODY MASS INDEX: 32.51 KG/M2 | DIASTOLIC BLOOD PRESSURE: 84 MMHG | HEIGHT: 72 IN | OXYGEN SATURATION: 99 % | HEART RATE: 66 BPM

## 2024-08-06 DIAGNOSIS — R51.9 NEW ONSET HEADACHE: ICD-10-CM

## 2024-08-06 DIAGNOSIS — G43.709 CHRONIC MIGRAINE WITHOUT AURA: Primary | ICD-10-CM

## 2024-08-06 DIAGNOSIS — A69.20 LYME DISEASE: ICD-10-CM

## 2024-08-06 PROCEDURE — 99244 OFF/OP CNSLTJ NEW/EST MOD 40: CPT | Performed by: PSYCHIATRY & NEUROLOGY

## 2024-08-06 RX ORDER — DEXAMETHASONE 2 MG/1
2 TABLET ORAL
Qty: 5 TABLET | Refills: 0 | Status: SHIPPED | OUTPATIENT
Start: 2024-08-06

## 2024-08-06 NOTE — PROGRESS NOTES
Patient ID: Endy Collier is a 24 y.o. male.    Assessment/Plan:    This is a 23 y/o Male who is here as a new patient to establish care with us for headaches. Etiology of the headache is post lyme headache. Recommend doing steroids for 5 days.     PLAN:      Diagnoses and all orders for this visit:    Chronic migraine without aura  -     MRI brain with and without contrast; Future    New onset headache  -post lyme headache most likely   -will do steroids for 5 days  -recommend getting MRI brain with and without contrast as well.   -     Ambulatory Referral to Neurology  -     dexamethasone (DECADRON) 2 mg tablet; Take 1 tablet (2 mg total) by mouth daily with breakfast    Lyme disease  -finished treatment with doxycycline        Follow up as needed.     I would be happy to see the patient sooner if any new questions/concerns arise.  Patient/Guardian was advised to the call the office if they have any questions and concerns in the meantime.     Patient/Guardian does understand that if they have any new stroke like symptoms such as facial droop on one side, weakness/paralysis on either side, speech trouble, numbness on one side, balance issues, any vision changes, extreme dizziness or any new headache, to call 9-1-1 immediately or to proceed to the nearest ER immediately.      Subjective:    HPI    This is a 24-year-old male who is here as a new patient to establish care with us.    He had lyme disease 1 month ago, and finished course of doxycyline he started having headaches at that time. Dull, nagging, constant and at which point lyme disease was checked, and was positive, and patient was treated with doxycycline, and ctontinued to have headaches after for the past 1 month, and has not had a headache free day. Patient has tried advil as needed, tylenol without much relief.     The following portions of the patient's history were reviewed and updated as appropriate: He  has a past medical history of Diabetes  mellitus (HCC), Headache(784.0), and Type 1 diabetes ([facility]) (2019).  He   Patient Active Problem List    Diagnosis Date Noted    Lyme disease 08/06/2024    Annual physical exam 07/24/2024    New onset headache 06/18/2024    Type 1 diabetes mellitus without complication ([facility]) 06/10/2024    Essential hypertension 06/10/2024    Prerenal azotemia 06/10/2024     He  has no past surgical history on file.  His family history includes Cancer in his father; Hypothyroidism in his mother; Migraines in his maternal grandmother and mother; No Known Problems in his brother.  He  reports that he has never smoked. He has never been exposed to tobacco smoke. He has never used smokeless tobacco. He reports that he does not currently use alcohol. He reports that he does not use drugs.  Current Outpatient Medications   Medication Sig Dispense Refill    amLODIPine (NORVASC) 5 mg tablet Take 1 tablet (5 mg total) by mouth daily 30 tablet 1    dexamethasone (DECADRON) 2 mg tablet Take 1 tablet (2 mg total) by mouth daily with breakfast 5 tablet 0    insulin aspart (NovoLOG FlexPen) 100 UNIT/ML injection pen       Lantus SoloStar 100 units/mL SOPN Inject 0.2 mL (20 Units total) under the skin every evening      lisinopril (ZESTRIL) 10 mg tablet Take 1 tablet (10 mg total) by mouth daily 30 tablet 1    naproxen (Naprosyn) 500 mg tablet Take 1 tablet (500 mg total) by mouth 2 (two) times a day with meals 60 tablet 1    ondansetron (ZOFRAN-ODT) 4 mg disintegrating tablet Take 1 tablet (4 mg total) by mouth every 6 (six) hours as needed for nausea or vomiting 30 tablet 1    pantoprazole (PROTONIX) 20 mg tablet Take 1 tablet (20 mg total) by mouth daily before breakfast 30 tablet 5    traMADol (Ultram) 50 mg tablet Take 1 tablet (50 mg total) by mouth every 6 (six) hours as needed for moderate pain 45 tablet 0     No current facility-administered medications for this visit.     Current Outpatient Medications on File Prior to Visit   Medication  Sig    amLODIPine (NORVASC) 5 mg tablet Take 1 tablet (5 mg total) by mouth daily    insulin aspart (NovoLOG FlexPen) 100 UNIT/ML injection pen     Lantus SoloStar 100 units/mL SOPN Inject 0.2 mL (20 Units total) under the skin every evening    lisinopril (ZESTRIL) 10 mg tablet Take 1 tablet (10 mg total) by mouth daily    naproxen (Naprosyn) 500 mg tablet Take 1 tablet (500 mg total) by mouth 2 (two) times a day with meals    ondansetron (ZOFRAN-ODT) 4 mg disintegrating tablet Take 1 tablet (4 mg total) by mouth every 6 (six) hours as needed for nausea or vomiting    pantoprazole (PROTONIX) 20 mg tablet Take 1 tablet (20 mg total) by mouth daily before breakfast    traMADol (Ultram) 50 mg tablet Take 1 tablet (50 mg total) by mouth every 6 (six) hours as needed for moderate pain     No current facility-administered medications on file prior to visit.     He has No Known Allergies..     Objective:    Blood pressure 122/84, pulse 66, temperature 97.8 °F (36.6 °C), temperature source Temporal, height 6' (1.829 m), weight 109 kg (240 lb), SpO2 99%.    Physical Exam  General - patient is alert   Speech - no dysarthria noted, no aphasia noted.     Neuro:   Cranial nerves: PERRL, EOMI, facial sensation intact to soft touch in V1, V2 and V3, no facial asymmetry noted, uvula/palate midline, tongue midline.   Motor: 5/5 throughout, normal tone, no pronator drift noted.   Sensory - intact to soft touch throughout  Reflexes - 2+ throughout  Coordination - no ataxia/dysmetria noted  Gait - normal    Neurological Exam      ROS:    Review of Systems   Constitutional: Negative.    HENT: Negative.     Eyes: Negative.    Respiratory: Negative.     Cardiovascular: Negative.    Gastrointestinal:  Positive for nausea and vomiting.   Endocrine: Negative.    Genitourinary: Negative.    Musculoskeletal: Negative.    Skin: Negative.    Allergic/Immunologic: Negative.    Neurological:  Positive for headaches (all around head(constant)).    Hematological: Negative.    Psychiatric/Behavioral: Negative.     All other systems reviewed and are negative.

## 2024-09-14 ENCOUNTER — HOSPITAL ENCOUNTER (OUTPATIENT)
Dept: MRI IMAGING | Facility: HOSPITAL | Age: 24
Discharge: HOME | End: 2024-09-14
Attending: PSYCHIATRY & NEUROLOGY
Payer: COMMERCIAL

## 2024-09-14 DIAGNOSIS — G43.709 CHRONIC MIGRAINE WITHOUT AURA: ICD-10-CM

## 2024-09-14 PROCEDURE — A9585 GADOBUTROL INJECTION: HCPCS | Performed by: PSYCHIATRY & NEUROLOGY

## 2024-09-14 PROCEDURE — 70553 MRI BRAIN STEM W/O & W/DYE: CPT

## 2024-09-14 RX ORDER — GADOBUTROL 604.72 MG/ML
10 INJECTION INTRAVENOUS
Status: COMPLETED | OUTPATIENT
Start: 2024-09-14 | End: 2024-09-14

## 2024-09-14 RX ADMIN — GADOBUTROL 10 ML: 604.72 INJECTION INTRAVENOUS at 09:38

## 2024-10-08 ENCOUNTER — VBI (OUTPATIENT)
Dept: ADMINISTRATIVE | Facility: OTHER | Age: 24
End: 2024-10-08

## 2024-10-08 NOTE — TELEPHONE ENCOUNTER
10/08/24 8:30 AM     Chart reviewed for Diabetic Eye Exam ; nothing is submitted to the patient's insurance at this time.     TEGAN DILLARD MA   [facility]

## 2025-07-18 ENCOUNTER — RA CDI HCC (OUTPATIENT)
Dept: OTHER | Facility: HOSPITAL | Age: 25
End: 2025-07-18

## 2025-07-30 ENCOUNTER — OFFICE VISIT (OUTPATIENT)
Dept: FAMILY MEDICINE CLINIC | Facility: CLINIC | Age: 25
End: 2025-07-30
Payer: COMMERCIAL

## 2025-07-30 VITALS
OXYGEN SATURATION: 98 % | WEIGHT: 252.4 LBS | SYSTOLIC BLOOD PRESSURE: 142 MMHG | HEART RATE: 88 BPM | HEIGHT: 72 IN | BODY MASS INDEX: 34.19 KG/M2 | DIASTOLIC BLOOD PRESSURE: 104 MMHG

## 2025-07-30 DIAGNOSIS — G44.229 CHRONIC TENSION-TYPE HEADACHE, NOT INTRACTABLE: ICD-10-CM

## 2025-07-30 DIAGNOSIS — E10.9 TYPE 1 DIABETES MELLITUS WITHOUT COMPLICATION (HCC): ICD-10-CM

## 2025-07-30 DIAGNOSIS — I10 HYPERTENSION, UNSPECIFIED TYPE: ICD-10-CM

## 2025-07-30 DIAGNOSIS — Z00.00 ANNUAL PHYSICAL EXAM: ICD-10-CM

## 2025-07-30 DIAGNOSIS — I10 ESSENTIAL HYPERTENSION: Primary | ICD-10-CM

## 2025-07-30 PROCEDURE — 99213 OFFICE O/P EST LOW 20 MIN: CPT | Performed by: FAMILY MEDICINE

## 2025-07-30 PROCEDURE — 99395 PREV VISIT EST AGE 18-39: CPT | Performed by: FAMILY MEDICINE

## 2025-07-30 RX ORDER — NAPROXEN 500 MG/1
500 TABLET ORAL 2 TIMES DAILY PRN
Qty: 30 TABLET | Refills: 1 | Status: SHIPPED | OUTPATIENT
Start: 2025-07-30

## 2025-07-30 RX ORDER — AMLODIPINE BESYLATE 5 MG/1
5 TABLET ORAL DAILY
Qty: 30 TABLET | Refills: 1 | Status: SHIPPED | OUTPATIENT
Start: 2025-07-30

## 2025-08-13 ENCOUNTER — TELEPHONE (OUTPATIENT)
Dept: FAMILY MEDICINE CLINIC | Facility: CLINIC | Age: 25
End: 2025-08-13